# Patient Record
Sex: FEMALE | Race: WHITE | NOT HISPANIC OR LATINO | URBAN - METROPOLITAN AREA
[De-identification: names, ages, dates, MRNs, and addresses within clinical notes are randomized per-mention and may not be internally consistent; named-entity substitution may affect disease eponyms.]

---
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
## 2020-09-09 ENCOUNTER — OFFICE VISIT (OUTPATIENT)
Dept: PRIMARY CARE | Facility: CLINIC | Age: 20
End: 2020-09-09
Payer: COMMERCIAL

## 2020-09-09 DIAGNOSIS — L70.0 ACNE VULGARIS: ICD-10-CM

## 2020-09-09 DIAGNOSIS — G25.0 BENIGN ESSENTIAL TREMOR: ICD-10-CM

## 2020-09-09 DIAGNOSIS — N94.6 DYSMENORRHEA: Primary | ICD-10-CM

## 2020-09-09 PROCEDURE — 99203 OFFICE O/P NEW LOW 30 MIN: CPT | Performed by: NURSE PRACTITIONER

## 2020-09-09 RX ORDER — NAPROXEN 500 MG/1
500 TABLET ORAL 2 TIMES DAILY WITH MEALS
COMMUNITY
End: 2021-04-14 | Stop reason: SDUPTHER

## 2020-09-09 RX ORDER — NORETHINDRONE ACETATE AND ETHINYL ESTRADIOL 1MG-20(24)
1 KIT ORAL
COMMUNITY
Start: 2020-08-03 | End: 2021-01-13 | Stop reason: ALTCHOICE

## 2020-09-09 RX ORDER — DOXYCYCLINE 100 MG/1
100 CAPSULE ORAL 2 TIMES DAILY
COMMUNITY
End: 2021-12-28 | Stop reason: ALTCHOICE

## 2020-09-09 ASSESSMENT — ENCOUNTER SYMPTOMS
ENDOCRINE NEGATIVE: 1
DIZZINESS: 0
MUSCULOSKELETAL NEGATIVE: 1
WOUND: 0
CARDIOVASCULAR NEGATIVE: 1
DYSPHORIC MOOD: 0
HYPERACTIVE: 0
WEAKNESS: 0
RESPIRATORY NEGATIVE: 1
FACIAL ASYMMETRY: 0
EYES NEGATIVE: 1
SLEEP DISTURBANCE: 0
LIGHT-HEADEDNESS: 0
DECREASED CONCENTRATION: 0
NUMBNESS: 0
HEMATOLOGIC/LYMPHATIC NEGATIVE: 1
SEIZURES: 0
HEADACHES: 0
SPEECH DIFFICULTY: 0
CONSTITUTIONAL NEGATIVE: 1
COLOR CHANGE: 0
CONFUSION: 0
TREMORS: 1
AGITATION: 0
NERVOUS/ANXIOUS: 1
HALLUCINATIONS: 0

## 2020-09-09 NOTE — PROGRESS NOTES
Verification of Patient Location:  The patient affirms they are currently located in the following state: [place]    Request for Consent:    Video Encounter   Hello, my name is JULIO Mckeon.  Before we proceed, can you please verify your identification by telling me your full name and date of birth?  Can you tell me who is in the room with you?    You and I are about to have a telemedicine check-in or visit because you have requested it.  This is a live video-conference.  I am a real person, speaking to you in real time.  There is no one else with me on the video-conference.  However, when we use ([employer], [employer], etc) it is important for you to know that the video-conference may not be secure or private.  I am not recording this conversation and I am asking you not to record it.  This telemedicine visit will be billed to your health insurance or you, if you are self-insured.  You understand you will be responsible for any copayments or coinsurances that apply to your telemedicine visit.  Communication platform used for this encounter:  [employer]     Before starting our telemedicine visit, I am required to get your consent for this virtual check-in or visit by telemedicine. Do you consent?      Patient Response to Request for Consent:  Yes    Patient Active Problem List   Diagnosis   • Dysmenorrhea   • Benign essential tremor   • Acne vulgaris     Current Outpatient Medications on File Prior to Visit   Medication Sig Dispense Refill   • doxycycline hyclate (VIBRAMYCIN) 100 mg capsule Take 100 mg by mouth 2 (two) times a day.     • naproxen (NAPROSYN) 500 mg tablet Take 500 mg by mouth 2 (two) times a day with meals.     • BLISOVI 24 FE 1 mg-20 mcg (24)/75 mg (4) per tablet Take 1 tablet by mouth once daily.       No current facility-administered medications on file prior to visit.      No Known Allergies  Social History     Socioeconomic History   • Marital status: Single     Spouse name: Not on file    • Number of children: Not on file   • Years of education: Not on file   • Highest education level: Not on file   Occupational History   • Occupation: student     Comment: [employer]   Social Needs   • Financial resource strain: Not on file   • Food insecurity:     Worry: Not on file     Inability: Not on file   • Transportation needs:     Medical: Not on file     Non-medical: Not on file   Tobacco Use   • Smoking status: Never Smoker   • Smokeless tobacco: Never Used   Substance and Sexual Activity   • Alcohol use: Not Currently   • Drug use: Never   • Sexual activity: Not Currently   Lifestyle   • Physical activity:     Days per week: Not on file     Minutes per session: Not on file   • Stress: Not on file   Relationships   • Social connections:     Talks on phone: Not on file     Gets together: Not on file     Attends [employer] service: Not on file     Active member of club or organization: Not on file     Attends meetings of clubs or organizations: Not on file     Relationship status: Not on file   • Intimate partner violence:     Fear of current or ex partner: Not on file     Emotionally abused: Not on file     Physically abused: Not on file     Forced sexual activity: Not on file   Other Topics Concern   • Not on file   Social History Narrative   • Not on file     Health Maintenance   Topic Date Due   • MMR Vaccines (1 of 1 - Standard series) 10/01/2001   • Varicella Vaccines (1 of 2 - 2-dose childhood series) 10/01/2001   • DTaP, Tdap, and Td Vaccines (1 - Tdap) 10/01/2011   • HPV Vaccines (1 - Female 2-dose series) 10/01/2011   • HIV Screening  10/01/2013   • Hepatitis C Screening  10/01/2018   • Influenza Vaccine (1) 08/01/2020   • Hepatitis A vaccines  Aged Out   • Meningococcal ACWY  Aged Out   • HIB Vaccines  Aged Out   • Hepatitis B Vaccines  Aged Out   • IPV Vaccines  Aged Out   • Pneumococcal  Aged Out       Visit Documentation:  Subjective     Patient ID: Jada Holt is a 19 y.o.  female.  2000      New patient to get established.    Dysmenorrhea-chronic for years. Using OCs to manage and Naproxen PRN cramps. Regular menses. No current symptoms. GYN exam has been performed and is UTD.    Acne. Chronic for years. Has been on several topical and oral treatments. Doxycycline seems to work the best for her. Not using at present--acne has been stable.    Hx benign tremor in hands. Pediatrician worked up-no major findings. Minor at present. Does not want meds to treat. No weakness or N/T symptoms.      The following have been reviewed and updated as appropriate in this visit:  Allergies  Meds  Problems  Med Hx  Surg Hx  Fam Hx  Soc Hx      Review of Systems   Constitutional: Negative.    Eyes: Negative.    Respiratory: Negative.    Cardiovascular: Negative.    Gastrointestinal:        Dairy intolerance  Avoids dairy and gluten   Endocrine: Negative.    Genitourinary: Negative.         Hx yeast infection   Musculoskeletal: Negative.    Skin: Negative for color change, pallor, rash and wound.        Hx acne   Allergic/Immunologic: Positive for environmental allergies. Negative for food allergies and immunocompromised state.   Neurological: Positive for tremors. Negative for dizziness, seizures, syncope, facial asymmetry, speech difficulty, weakness, light-headedness, numbness and headaches.   Hematological: Negative.    Psychiatric/Behavioral: Negative for agitation, behavioral problems, confusion, decreased concentration, dysphoric mood, hallucinations, self-injury, sleep disturbance and suicidal ideas. The patient is nervous/anxious. The patient is not hyperactive.        Physical Exam   Constitutional: She is oriented to person, place, and time. She appears well-developed and well-nourished. No distress.   Pulmonary/Chest: Effort normal. No respiratory distress.   Neurological: She is alert and oriented to person, place, and time.   Skin: She is not diaphoretic.   Psychiatric: She has a  normal mood and affect. Her speech is normal and behavior is normal.       Assessment/Plan   Diagnoses and all orders for this visit:    Dysmenorrhea (Primary)  Assessment & Plan:  Stable on OCs and Naproxen PRN.  Has seen a GYN in the past.  Can come here for GYN if prefers.      Benign essential tremor  Assessment & Plan:  Not bothersome at present.  Mild at times.  No treatment requested at this time.      Acne vulgaris  Assessment & Plan:  Stable.  Not currently on Doxy--uses if she gets a flare.      Will get flu shot at school.  Time Spent in Medical Discussion During This Encounter:    15 minutes

## 2021-01-13 ENCOUNTER — OFFICE VISIT (OUTPATIENT)
Dept: PRIMARY CARE | Facility: CLINIC | Age: 21
End: 2021-01-13
Payer: COMMERCIAL

## 2021-01-13 VITALS
BODY MASS INDEX: 24.11 KG/M2 | HEIGHT: 62 IN | TEMPERATURE: 97.8 F | HEART RATE: 82 BPM | WEIGHT: 131 LBS | SYSTOLIC BLOOD PRESSURE: 120 MMHG | DIASTOLIC BLOOD PRESSURE: 82 MMHG

## 2021-01-13 DIAGNOSIS — J30.89 NON-SEASONAL ALLERGIC RHINITIS DUE TO OTHER ALLERGIC TRIGGER: Primary | ICD-10-CM

## 2021-01-13 DIAGNOSIS — N94.6 DYSMENORRHEA: ICD-10-CM

## 2021-01-13 DIAGNOSIS — F41.1 GENERALIZED ANXIETY DISORDER: ICD-10-CM

## 2021-01-13 PROCEDURE — 99213 OFFICE O/P EST LOW 20 MIN: CPT | Performed by: NURSE PRACTITIONER

## 2021-01-13 RX ORDER — NORGESTREL AND ETHINYL ESTRADIOL 0.3-0.03MG
1 KIT ORAL
COMMUNITY
Start: 2020-10-14 | End: 2021-08-19 | Stop reason: ALTCHOICE

## 2021-01-13 ASSESSMENT — ENCOUNTER SYMPTOMS
SORE THROAT: 0
HEARTBURN: 0
HEADACHES: 0
CHILLS: 0
SHORTNESS OF BREATH: 0
WEIGHT LOSS: 0
HEMOPTYSIS: 0
FEVER: 0
RHINORRHEA: 0
COUGH: 1
WHEEZING: 0

## 2021-01-13 ASSESSMENT — PATIENT HEALTH QUESTIONNAIRE - PHQ9: SUM OF ALL RESPONSES TO PHQ9 QUESTIONS 1 & 2: 0

## 2021-01-13 NOTE — PROGRESS NOTES
[facility]  Majo Howard  [address], [address]  [place] [address]  Phone: [phone]  Fax: [phone]      Patient ID: Jada Holt                              : 2000    Visit Date: 2021    Chief Complaint: Follow-up (had covid in oct and is having after affects like cough in the morning )         Patient ID: Jada Holt is a 20 y.o. female.    Patient Active Problem List   Diagnosis   • Dysmenorrhea   • Benign essential tremor   • Acne vulgaris   • Generalized anxiety disorder   • Non-seasonal allergic rhinitis         Current Outpatient Medications:   •  CRYSELLE, 28, 0.3-30 mg-mcg per tablet, Take 1 tablet by mouth once daily., Disp: , Rfl:   •  doxycycline hyclate (VIBRAMYCIN) 100 mg capsule, Take 100 mg by mouth 2 (two) times a day., Disp: , Rfl:   •  naproxen (NAPROSYN) 500 mg tablet, Take 500 mg by mouth 2 (two) times a day with meals., Disp: , Rfl:     No Known Allergies    Social History     Tobacco Use   • Smoking status: Never Smoker   • Smokeless tobacco: Never Used   Substance Use Topics   • Alcohol use: Not Currently   • Drug use: Never       Health Maintenance   Topic Date Due   • Influenza Vaccine (1) 2022 (Originally 2020)   • DTaP, Tdap, and Td Vaccines (7 - Td) 2021   • Meningococcal ACWY  Completed   • Varicella Vaccines  Completed   • HIB Vaccines  Completed   • IPV Vaccines  Completed   • HPV Vaccines  Completed   • Pneumococcal  Aged Out   • HIV Screening  Discontinued   • Hepatitis C Screening  Discontinued       HPI  Follow up.  Doing better with anxiety.  Mostly COVID related.  Got COVID in November.  Mild symptoms for about 1 week.  Recovered.  Wants to be checked out to make sure lungs are ok.  Denies CP, SOB  Has some post nasal drip.    Cough  This is a new problem. The current episode started more than 1 month ago. The problem has been unchanged. The cough is non-productive. Associated  "symptoms include postnasal drip. Pertinent negatives include no chest pain, chills, ear pain, fever, headaches, heartburn, hemoptysis, nasal congestion, rhinorrhea, sore throat, shortness of breath, weight loss or wheezing. Exacerbated by: worse in AM. She has tried nothing for the symptoms. Her past medical history is significant for environmental allergies.       The following have been reviewed and updated as appropriate in this visit:  Allergies  Meds  Problems         Review of System  Review of Systems   Constitutional: Negative for chills, fever and weight loss.   HENT: Positive for postnasal drip. Negative for ear pain, rhinorrhea and sore throat.    Respiratory: Positive for cough. Negative for hemoptysis, shortness of breath and wheezing.    Cardiovascular: Negative for chest pain.   Gastrointestinal: Negative for heartburn.   Allergic/Immunologic: Positive for environmental allergies.   Neurological: Negative for headaches.       Objective     Vitals  Vitals:    01/13/21 1410   BP: 120/82   BP Location: Left upper arm   Patient Position: Sitting   Pulse: 82   Temp: 36.6 °C (97.8 °F)   Weight: 59.4 kg (131 lb)   Height: 1.582 m (5' 2.3\")     Body mass index is 23.73 kg/m².    Physical Exam  Physical Exam  Vitals signs reviewed.   Constitutional:       General: She is not in acute distress.     Appearance: Normal appearance. She is not diaphoretic.   HENT:      Right Ear: Tympanic membrane, ear canal and external ear normal.      Left Ear: Tympanic membrane, ear canal and external ear normal.      Nose: Nose normal.      Mouth/Throat:      Mouth: Mucous membranes are moist.      Pharynx: Oropharynx is clear. Posterior oropharyngeal erythema present. No oropharyngeal exudate.   Cardiovascular:      Rate and Rhythm: Normal rate and regular rhythm.      Heart sounds: No murmur. No friction rub. No gallop.    Pulmonary:      Effort: Pulmonary effort is normal.      Breath sounds: Normal breath sounds. No " wheezing, rhonchi or rales.   Neurological:      Mental Status: She is alert and oriented to person, place, and time.   Psychiatric:         Mood and Affect: Mood and affect normal.         Speech: Speech normal.         Behavior: Behavior normal.         Thought Content: Thought content does not include suicidal ideation. Thought content does not include suicidal plan.         Assessment/Plan     Problem List Items Addressed This Visit     Dysmenorrhea     Stable on OCs.  Continue med.  No side effects.  GYN manages.         Generalized anxiety disorder     Stable.  Doing better now.  Not taking meds.  Encouraged healthy diet, regular exercise, adequate sleep.         Non-seasonal allergic rhinitis - Primary     Saline NS BID  Claritin or Zyrtec daily  Push po fluids.           Over 50% of 20 minute visit spent in counseling and education today          JULIO Mckeon  1/13/2021

## 2021-01-13 NOTE — ASSESSMENT & PLAN NOTE
Stable.  Doing better now.  Not taking meds.  Encouraged healthy diet, regular exercise, adequate sleep.

## 2021-05-10 ENCOUNTER — IMMUNIZATION (OUTPATIENT)
Dept: IMMUNIZATION | Facility: CLINIC | Age: 21
End: 2021-05-10

## 2021-06-08 ENCOUNTER — IMMUNIZATION (OUTPATIENT)
Dept: IMMUNIZATION | Facility: CLINIC | Age: 21
End: 2021-06-08
Attending: FAMILY MEDICINE

## 2021-07-09 DIAGNOSIS — N94.6 DYSMENORRHEA: ICD-10-CM

## 2021-07-09 RX ORDER — NAPROXEN 500 MG/1
TABLET ORAL
Qty: 180 TABLET | Refills: 0 | Status: SHIPPED | OUTPATIENT
Start: 2021-07-09 | End: 2021-10-12

## 2021-07-09 NOTE — TELEPHONE ENCOUNTER
Medicine Refill Request    Last Office Visit: 1/13/2021  Last Telemedicine Visit: Visit date not found    Next Office Visit: Visit date not found  Next Telemedicine Visit: Visit date not found         Current Outpatient Medications:   •  CRYSELLE, 28, 0.3-30 mg-mcg per tablet, Take 1 tablet by mouth once daily., Disp: , Rfl:   •  doxycycline hyclate (VIBRAMYCIN) 100 mg capsule, Take 100 mg by mouth 2 (two) times a day., Disp: , Rfl:   •  naproxen (NAPROSYN) 500 mg tablet, Take 1 tablet (500 mg total) by mouth 2 (two) times a day with meals., Disp: 180 tablet, Rfl: 0      BP Readings from Last 3 Encounters:   01/13/21 120/82       Recent Lab results:  No results found for: CHOL, No results found for: HDL, No results found for: LDLCALC, No results found for: TRIG     No results found for: GLUCOSE, No results found for: HGBA1C      No results found for: CREATININE    No results found for: TSH

## 2021-08-02 PROBLEM — K90.0 CELIAC DISEASE: Status: ACTIVE | Noted: 2021-08-02

## 2021-08-19 ENCOUNTER — OFFICE VISIT (OUTPATIENT)
Dept: PRIMARY CARE | Facility: CLINIC | Age: 21
End: 2021-08-19
Payer: COMMERCIAL

## 2021-08-19 VITALS
RESPIRATION RATE: 16 BRPM | WEIGHT: 139 LBS | HEART RATE: 82 BPM | OXYGEN SATURATION: 98 % | SYSTOLIC BLOOD PRESSURE: 110 MMHG | BODY MASS INDEX: 25.58 KG/M2 | DIASTOLIC BLOOD PRESSURE: 60 MMHG | HEIGHT: 62 IN

## 2021-08-19 DIAGNOSIS — B00.9 HERPES SIMPLEX: ICD-10-CM

## 2021-08-19 DIAGNOSIS — L70.0 ACNE VULGARIS: ICD-10-CM

## 2021-08-19 DIAGNOSIS — K90.0 CELIAC DISEASE: Primary | ICD-10-CM

## 2021-08-19 PROCEDURE — 99213 OFFICE O/P EST LOW 20 MIN: CPT | Performed by: NURSE PRACTITIONER

## 2021-08-19 PROCEDURE — 3008F BODY MASS INDEX DOCD: CPT | Performed by: NURSE PRACTITIONER

## 2021-08-19 RX ORDER — NORETHINDRONE ACETATE AND ETHINYL ESTRADIOL 1MG-20(24)
1 KIT ORAL
COMMUNITY
Start: 2021-06-30

## 2021-08-19 RX ORDER — VALACYCLOVIR HYDROCHLORIDE 1 G/1
1000 TABLET, FILM COATED ORAL DAILY
Qty: 30 TABLET | Refills: 1 | Status: SHIPPED | OUTPATIENT
Start: 2021-08-19 | End: 2021-09-13

## 2021-08-19 ASSESSMENT — ENCOUNTER SYMPTOMS
RESPIRATORY NEGATIVE: 1
CARDIOVASCULAR NEGATIVE: 1
CONSTITUTIONAL NEGATIVE: 1
GASTROINTESTINAL NEGATIVE: 1

## 2021-08-19 NOTE — ASSESSMENT & PLAN NOTE
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  Valtrex Rx given to use daily during stressful weeks or PRN

## 2021-08-19 NOTE — PROGRESS NOTES
[facility]  [place]  [place] [address]  [phone]  Fax [phone]      Patient ID: Jada Holt                              : 2000    Visit Date: 2021    Chief Complaint: Celiac Disease         Patient ID: Jada Holt is a 20 y.o. female.    Patient Active Problem List   Diagnosis   • Dysmenorrhea   • Benign essential tremor   • Acne vulgaris   • Generalized anxiety disorder   • Non-seasonal allergic rhinitis   • Celiac disease   • Herpes simplex         Current Outpatient Medications:   •  BLISOVI 24 FE 1 mg-20 mcg (24)/75 mg (4) per tablet, Take 1 tablet by mouth once daily., Disp: , Rfl:   •  doxycycline hyclate (VIBRAMYCIN) 100 mg capsule, Take 100 mg by mouth 2 (two) times a day., Disp: , Rfl:   •  naproxen (NAPROSYN) 500 mg tablet, TAKE 1 TABLET BY MOUTH 2 TIMES A DAY WITH MEALS., Disp: 180 tablet, Rfl: 0  •  valACYclovir (VALTREX) 1 gram tablet, Take 1 tablet (1,000 mg total) by mouth daily., Disp: 30 tablet, Rfl: 1    Allergies   Allergen Reactions   • Gluten Diarrhea and GI intolerance       Social History     Tobacco Use   • Smoking status: Never Smoker   • Smokeless tobacco: Never Used   Substance Use Topics   • Alcohol use: Not Currently   • Drug use: Never       Health Maintenance   Topic Date Due   • Influenza Vaccine (1) 2021   • DTaP, Tdap, and Td Vaccines (7 - Td or Tdap) 2021   • Meningococcal ACWY  Completed   • HIB Vaccines  Completed   • IPV Vaccines  Completed   • HPV Vaccines  Completed   • COVID-19 Vaccine  Completed   • Pneumococcal  Aged Out   • HIV Screening  Discontinued   • Hepatitis C Screening  Discontinued       HPI  College joceline @ [employer]  Leaving tomorrow for college.  Here for follow up.  Dx with Celiac disease recently.  Doing well.  Easy to follow diet for her.  Needs Rx for cold sores.  Doing well on OCs-- menses very light now.      The following have been reviewed and  "updated as appropriate in this visit:  Allergies  Meds  Problems         Review of System  Review of Systems   Constitutional: Negative.    Respiratory: Negative.    Cardiovascular: Negative.    Gastrointestinal: Negative.        Objective     Vitals  Vitals:    08/19/21 1105   BP: 110/60   Pulse: 82   Resp: 16   SpO2: 98%   Weight: 63 kg (139 lb)   Height: 1.582 m (5' 2.3\")     Body mass index is 25.18 kg/m².    Physical Exam  Physical Exam  Vitals reviewed.   Constitutional:       General: She is not in acute distress.     Appearance: Normal appearance. She is not diaphoretic.   Cardiovascular:      Rate and Rhythm: Normal rate and regular rhythm.      Heart sounds: No murmur heard.   No friction rub. No gallop.    Pulmonary:      Effort: Pulmonary effort is normal.      Breath sounds: Normal breath sounds. No wheezing, rhonchi or rales.   Neurological:      Mental Status: She is alert and oriented to person, place, and time.   Psychiatric:         Mood and Affect: Mood and affect normal.         Speech: Speech normal.         Behavior: Behavior normal.         Assessment/Plan     Problem List Items Addressed This Visit     Acne vulgaris     Using Doxycyline PRN when her acne flares( not that often).         Celiac disease - Primary     New dx.  Stable.  Has been compliant with strict gluten free diet.  GI following.  No current manifestations.           Herpes simplex     Occasional flares of cold sores with stress.  Valtrex Rx given to use daily during stressful weeks or PRN         Relevant Medications    valACYclovir (VALTREX) 1 gram tablet              JULIO Mckeon  8/19/2021  "

## 2021-08-19 NOTE — ASSESSMENT & PLAN NOTE
New dx.  Stable.  Has been compliant with strict gluten free diet.  GI following.  No current manifestations.

## 2021-09-12 DIAGNOSIS — B00.9 HERPES SIMPLEX: ICD-10-CM

## 2021-09-13 RX ORDER — VALACYCLOVIR HYDROCHLORIDE 1 G/1
1000 TABLET, FILM COATED ORAL DAILY
Qty: 30 TABLET | Refills: 1 | Status: SHIPPED | OUTPATIENT
Start: 2021-09-13 | End: 2021-10-06

## 2021-09-13 NOTE — TELEPHONE ENCOUNTER
Medicine Refill Request    Last Office Visit: 8/19/2021  Last Telemedicine Visit: Visit date not found    Next Office Visit: Visit date not found  Next Telemedicine Visit: Visit date not found         Current Outpatient Medications:   •  BLISOVI 24 FE 1 mg-20 mcg (24)/75 mg (4) per tablet, Take 1 tablet by mouth once daily., Disp: , Rfl:   •  doxycycline hyclate (VIBRAMYCIN) 100 mg capsule, Take 100 mg by mouth 2 (two) times a day., Disp: , Rfl:   •  naproxen (NAPROSYN) 500 mg tablet, TAKE 1 TABLET BY MOUTH 2 TIMES A DAY WITH MEALS., Disp: 180 tablet, Rfl: 0  •  valACYclovir (VALTREX) 1 gram tablet, Take 1 tablet (1,000 mg total) by mouth daily., Disp: 30 tablet, Rfl: 1      BP Readings from Last 3 Encounters:   08/19/21 110/60   01/13/21 120/82       Recent Lab results:  No results found for: CHOL, No results found for: HDL, No results found for: LDLCALC, No results found for: TRIG     No results found for: GLUCOSE, No results found for: HGBA1C      No results found for: CREATININE    No results found for: TSH

## 2021-09-14 ENCOUNTER — TELEMEDICINE (OUTPATIENT)
Dept: PRIMARY CARE | Facility: CLINIC | Age: 21
End: 2021-09-14
Payer: COMMERCIAL

## 2021-09-14 VITALS — BODY MASS INDEX: 25.4 KG/M2 | WEIGHT: 138 LBS | TEMPERATURE: 98.1 F | HEIGHT: 62 IN

## 2021-09-14 DIAGNOSIS — J40 BRONCHITIS: Primary | ICD-10-CM

## 2021-09-14 PROCEDURE — 3008F BODY MASS INDEX DOCD: CPT | Performed by: INTERNAL MEDICINE

## 2021-09-14 PROCEDURE — 99213 OFFICE O/P EST LOW 20 MIN: CPT | Mod: 95 | Performed by: INTERNAL MEDICINE

## 2021-09-14 RX ORDER — AZITHROMYCIN 250 MG/1
TABLET, FILM COATED ORAL
Qty: 6 TABLET | Refills: 0 | Status: SHIPPED | OUTPATIENT
Start: 2021-09-14 | End: 2021-09-19

## 2021-09-14 RX ORDER — ALBUTEROL SULFATE 90 UG/1
2 INHALANT RESPIRATORY (INHALATION) EVERY 6 HOURS PRN
Qty: 18 G | Refills: 1 | Status: SHIPPED | OUTPATIENT
Start: 2021-09-14 | End: 2021-10-14

## 2021-09-14 RX ORDER — BENZONATATE 100 MG/1
100 CAPSULE ORAL 3 TIMES DAILY PRN
Qty: 30 CAPSULE | Refills: 0 | Status: SHIPPED | OUTPATIENT
Start: 2021-09-14 | End: 2021-09-24

## 2021-09-14 ASSESSMENT — ENCOUNTER SYMPTOMS
WHEEZING: 1
HEMOPTYSIS: 0
MYALGIAS: 1
RHINORRHEA: 1
CHILLS: 0
FEVER: 0
HEARTBURN: 0
HEADACHES: 0
SORE THROAT: 0
SWEATS: 0
SHORTNESS OF BREATH: 0
COUGH: 1

## 2021-09-14 NOTE — ASSESSMENT & PLAN NOTE
URI.  Started with rhinorrhea, always gets infections when goes back to school.  Cough x 2 days.  Wheezy.  Hx bronchitis as child.  Greenish phlegm.  No fever.  Plan  Antibiotics Z pack as directed.  Benzonatate for cough suppression.  Albuterol inhaler.  Continue Mucinex over the counter to loosen secretions.  Continue tylenol as needed.  Try saline spray, steam inhalation, lozenges, humidifier to keep membranes moist.

## 2021-09-14 NOTE — PROGRESS NOTES
Verification of Patient Location:  The patient affirms they are currently located in the following state: [place]    Request for Consent:    Audio and Video Encounter   Teena, my name is Dina Gaona MD.  Before we proceed, can you please verify your identification by telling me your full name and date of birth?  Can you tell me who is in the room with you?    You and I are about to have a telemedicine check-in or visit because you have requested it.  This is a live video-conference.  I am a real person, speaking to you in real time.  There is no one else with me on the video-conference.  However, when we use ([employer], [employer], etc) it is important for you to know that the video-conference may not be secure or private.  I am not recording this conversation and I am asking you not to record it.  This telemedicine visit will be billed to your health insurance or you, if you are self-insured.  You understand you will be responsible for any copayments or coinsurances that apply to your telemedicine visit.  Communication platform used for this encounter:  [employer]     Before starting our telemedicine visit, I am required to get your consent for this virtual check-in or visit by telemedicine. Do you consent?      Patient Response to Request for Consent:  Yes      Visit Documentation:  Subjective     Patient ID: Jada Holt is a 20 y.o. female.  2000      Telemed visit for cough.     Developed a cough last week- started with clear runny nose.   Is now back at school.   No body aches, fever, sore throat.   Cough getting worse last 1-2 days.   Had home game-- thought it was from yelling.   Last night got worse.  Hx bronchitis going around campus.     Phlegm greenish.    Cough  This is a new problem. The current episode started in the past 7 days. The problem has been gradually worsening. The problem occurs constantly. The cough is productive of purulent sputum. Associated symptoms include ear pain, myalgias,  nasal congestion, postnasal drip, rhinorrhea and wheezing. Pertinent negatives include no chest pain, chills, ear congestion, fever, headaches, heartburn, hemoptysis, rash, sore throat, shortness of breath or sweats.       The following have been reviewed and updated as appropriate in this visit:  Tobacco  Allergies  Meds  Problems  Med Hx  Surg Hx  Fam Hx  Soc Hx        Review of Systems   Constitutional: Negative for chills and fever.   HENT: Positive for ear pain, postnasal drip and rhinorrhea. Negative for sore throat.    Respiratory: Positive for cough and wheezing. Negative for hemoptysis and shortness of breath.    Cardiovascular: Negative for chest pain.   Gastrointestinal: Negative for heartburn.   Musculoskeletal: Positive for myalgias.   Skin: Negative for rash.   Neurological: Negative for headaches.     Video Exam:  Atraumatic, normocephalic.  Ill appearing. Sounds congested.  Cough wheezy.   Eyes no discharge, redness; ears, nose visually normal.   Breathing comfortably, talking in complete sentences.   No obvious distress. No wheezing.   Mental status normal, visible cranial nerves normal.  No accessory muscle use.       Assessment/Plan   Diagnoses and all orders for this visit:    Bronchitis (Primary)  Assessment & Plan:  URI.  Started with rhinorrhea, always gets infections when goes back to school.  Cough x 2 days.  Wheezy.  Hx bronchitis as child.  Greenish phlegm.  No fever.  Plan  Antibiotics Z pack as directed.  Benzonatate for cough suppression.  Albuterol inhaler.  Continue Mucinex over the counter to loosen secretions.  Continue tylenol as needed.  Try saline spray, steam inhalation, lozenges, humidifier to keep membranes moist.          Orders:  -     azithromycin (ZITHROMAX) 250 mg tablet; Take 2 tablets the first day, then 1 tablet daily for 4 days.  -     albuterol HFA (VENTOLIN HFA) 90 mcg/actuation inhaler; Inhale 2 puffs every 6 (six) hours as needed for wheezing.  -      benzonatate (TESSALON PERLES) 100 mg capsule; Take 1 capsule (100 mg total) by mouth 3 (three) times a day as needed for cough for up to 10 days.      Time Spent:  I spent 20 minutes on this date of service performing the following activities: obtaining history, entering orders, documenting, preparing for visit and providing counseling and education.

## 2021-10-06 DIAGNOSIS — B00.9 HERPES SIMPLEX: ICD-10-CM

## 2021-10-06 RX ORDER — VALACYCLOVIR HYDROCHLORIDE 1 G/1
1000 TABLET, FILM COATED ORAL DAILY
Qty: 30 TABLET | Refills: 1 | Status: SHIPPED | OUTPATIENT
Start: 2021-10-06 | End: 2023-01-11 | Stop reason: SDUPTHER

## 2021-10-06 NOTE — TELEPHONE ENCOUNTER
Medicine Refill Request    Last Office Visit: 8/19/2021  Last Telemedicine Visit: 9/14/2021 Dnia Gaona MD    Next Office Visit: Visit date not found  Next Telemedicine Visit: Visit date not found         Current Outpatient Medications:   •  albuterol HFA (VENTOLIN HFA) 90 mcg/actuation inhaler, Inhale 2 puffs every 6 (six) hours as needed for wheezing., Disp: 18 g, Rfl: 1  •  BLISOVI 24 FE 1 mg-20 mcg (24)/75 mg (4) per tablet, Take 1 tablet by mouth once daily., Disp: , Rfl:   •  doxycycline hyclate (VIBRAMYCIN) 100 mg capsule, Take 100 mg by mouth 2 (two) times a day., Disp: , Rfl:   •  naproxen (NAPROSYN) 500 mg tablet, TAKE 1 TABLET BY MOUTH 2 TIMES A DAY WITH MEALS., Disp: 180 tablet, Rfl: 0  •  valACYclovir (VALTREX) 1 gram tablet, TAKE 1 TABLET (1,000 MG TOTAL) BY MOUTH DAILY., Disp: 30 tablet, Rfl: 1      BP Readings from Last 3 Encounters:   08/19/21 110/60   01/13/21 120/82       Recent Lab results:  No results found for: CHOL, No results found for: HDL, No results found for: LDLCALC, No results found for: TRIG     No results found for: GLUCOSE, No results found for: HGBA1C      No results found for: CREATININE    No results found for: TSH

## 2021-10-12 ENCOUNTER — TELEPHONE (OUTPATIENT)
Dept: PRIMARY CARE | Facility: CLINIC | Age: 21
End: 2021-10-12

## 2021-10-12 DIAGNOSIS — N94.6 DYSMENORRHEA: ICD-10-CM

## 2021-10-12 RX ORDER — NAPROXEN 500 MG/1
TABLET ORAL
Qty: 180 TABLET | Refills: 0 | Status: SHIPPED | OUTPATIENT
Start: 2021-10-12 | End: 2023-08-16 | Stop reason: ALTCHOICE

## 2021-10-12 NOTE — TELEPHONE ENCOUNTER
Mom  Called and would like to speak to Majo at some point. This patient is in the hospital and very sick.   Can we call her?

## 2021-10-12 NOTE — TELEPHONE ENCOUNTER
Medicine Refill Request    Last Office Visit: 8/19/2021  Last Telemedicine Visit: 9/14/2021 Dina Gaona MD    Next Office Visit: Visit date not found  Next Telemedicine Visit: Visit date not found         Current Outpatient Medications:   •  albuterol HFA (VENTOLIN HFA) 90 mcg/actuation inhaler, Inhale 2 puffs every 6 (six) hours as needed for wheezing., Disp: 18 g, Rfl: 1  •  BLISOVI 24 FE 1 mg-20 mcg (24)/75 mg (4) per tablet, Take 1 tablet by mouth once daily., Disp: , Rfl:   •  doxycycline hyclate (VIBRAMYCIN) 100 mg capsule, Take 100 mg by mouth 2 (two) times a day., Disp: , Rfl:   •  naproxen (NAPROSYN) 500 mg tablet, TAKE 1 TABLET BY MOUTH 2 TIMES A DAY WITH MEALS., Disp: 180 tablet, Rfl: 0  •  valACYclovir (VALTREX) 1 gram tablet, TAKE 1 TABLET (1,000 MG TOTAL) BY MOUTH DAILY., Disp: 30 tablet, Rfl: 1      BP Readings from Last 3 Encounters:   08/19/21 110/60   01/13/21 120/82       Recent Lab results:  No results found for: CHOL, No results found for: HDL, No results found for: LDLCALC, No results found for: TRIG     No results found for: GLUCOSE, No results found for: HGBA1C      No results found for: CREATININE    No results found for: TSH

## 2021-10-12 NOTE — TELEPHONE ENCOUNTER
Spoke with mom Lori.  Pt is a [employer] student.  Pt admitted for pneumonia and gram + sepsis. At [facility]( [facility]. Getting IV antibiotics and is on O2. Mom will keep us updated.

## 2021-10-22 NOTE — TELEPHONE ENCOUNTER
Spoke with mother Lori.  Pt done IV antibiotics today  Returning to classes Monday.  Anxious and worried she is behind.  Suggested counselor and school advocate to help her.  Appt here when she is home for thanksgiving break.

## 2021-10-22 NOTE — TELEPHONE ENCOUNTER
Mom called back today, daughter has been released, still having some pain left side, fatigue    Mom called back with an update    Mom's number [phone]

## 2021-11-23 ENCOUNTER — OFFICE VISIT (OUTPATIENT)
Dept: PRIMARY CARE | Facility: CLINIC | Age: 21
End: 2021-11-23
Payer: COMMERCIAL

## 2021-11-23 DIAGNOSIS — F41.8 OTHER SPECIFIED ANXIETY DISORDERS: Primary | ICD-10-CM

## 2021-11-23 ASSESSMENT — COGNITIVE AND FUNCTIONAL STATUS - GENERAL
DELUSIONS: NONE OR AGE APPROPRIATE
RECENT MEMORY: WNL
THOUGHT_PROCESS: WNL
IMPULSE CONTROL: INTACT
SLEEP_WAKE_CYCLE: NO CHANGE
APPEARANCE: WELL GROOMED
EYE_CONTACT: WNL
ATTENTION: WNL
PERCEPTUAL FUNCTION: NORMAL
ORIENTATION: FULLY ORIENTED
AROUSAL LEVEL: ALERT
APPETITE: NO CHANGE
AFFECT: FULL RANGE
INSIGHT: INTACT
CONCENTRATION: WNL
REMOTE MEMORY: MILD LOSS
THOUGHT_CONTENT: APPROPRIATE
PSYCHOMOTOR FUNCTIONING: WNL
MOOD: EUTHYMIC (NORMAL)
SPEECH: REGULAR

## 2021-11-23 NOTE — PROGRESS NOTES
Integrated Behavioral Health Outpatient Initial Visit- In office  Visit Type Performed: In-person    Jada Holt, a 21 y.o. female, presented today for an initial behavioral health evaluation visit.  Clinician confirmed identification of patient by name and birth date.      Informed Consent/Confidentiality:  Pt was explained the model of primary care behavioral health we provide at [facility], including the following:  The doctoral psychology intern/post-doctoral psychology fellow is under the direct supervision of a licensed psychologist (Suzi Sue Psy.D., Lilo Boone Psy.D., Huong Ruiz Psy.D., and Garrison Miller Psy.D.).   The psychology intern/fellow collaborates regularly with the pt’s PCP regarding the pt’s treatment. The integrated behavioral health progress notes are visible to the physicians and advanced practitioners in the practice where the pt is being seen. The visit diagnosis and appointment/scheduling information is visible to the patient's EPIC chart, to provide continuity of care across the [facility] system. The pt will also have access to view their notes via [employer] (pt portal).  This is a low-intensity model of care (we provide 8-10 visits of cognitive-behavioral therapy), and the patient has the right to other options of behavioral health care that are indicated for more severe conditions (ie: Traditional Outpatient psychotherapy, Intensive Outpatient Programs, Partial Hospitalization Programs, and Inpatient services).  The intern/fellow is providing this care by participating in a training program, and therefore they can not be involved with any legal issues/forms (including disability, FMLA, etc.).    Also discussed were confidentiality and the limits of confidentiality (ie: if pt is at imminent risk of suicide, homicide, or reports child abuse/neglect, providers may need to break confidentiality to ensure the safety of the pt or to follow mandated reporting requirements).   Pt expressed  "understanding and consent: Yes      SUBJECTIVE     Reason for visit: Samna presented today regarding increased anxiety-related symptoms     History of Behavioral Health Treatment  Previous treatment: Previous therapy: yes, the last time she was in therapy was in the summer of  due to a \"fear of eating carbs\" (e.g., rice, pasta).   Previously experienced symptoms of: Stress and anxiety   Other pertinent behavioral health history: Paternal aunt was diagnosed with bipolar disorder and  by suicide (Saman stated that she did not have a strong relationship with her aunt). Her maternal cousin also has depression.       Substance Use History  ETOH/alcohol use: minimal use, she has one drink per night (liquor) Thursday through Saturday, socially.    Other substance or supplement use: drugs: denied.; tobacco: denied; caffeine: coffee 1 /day      Social History  Important people in pt's life/Support network: Single. Has a close group of friends that she considers to be her strongest social supports.   Lives with: Currently lives in an apartment at [facility] with three roommates. She is planning on moving within the next few months which contributes to increased stress.    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 She experiences increased stress with her academic work load and feels like the impact of Covid negatively impacted her ability to study and retain information.       Symptoms  Depression symptoms: PHQ 9:  Little Interest or Pleasure in Doing Things: 0-->not at all    Feeling Down, Depressed or Hopeless: 0-->not at all    Trouble Falling or Staying Asleep, or Sleeping Too Much: 1-->several days (night awakenings)    Feeling Tired or Having Little Energy: 2-->more than half the days    Poor Appetite or " Overeatin-->several days (over-eating)    Feeling Bad about Yourself - or that You are a Failure or Have Let Yourself or Your Family Down: 1-->several days    Trouble Concentrating on Things, Such as Reading the Newspaper or Watching Television: 2-->more than half the days    Moving or Speaking So Slowly that Other People Could Have Noticed? Or the Opposite - Being So Fidgety: 0-->not at all    Thoughts that You Would be Better Off Dead or of Hurting Yourself in Some Way: 0-->not at all    PHQ-9: Brief Depression Severity Measure Score: 7      Anxiety symptoms: ENEDINA-7  Feeling nervous, anxious or on edge: 2-->More than half the days    Not being able to stop or control worryin-->Not at all    Worrying too much about different things: 1-->Several days    Trouble relaxin-->Not at all    Being so restless that it is hard to sit still: 0-->Not at all    Becoming easily annoyed or irritable: 0-->Not at all    Feeling afraid as if something awful might happen: 0-->Not at all      GAD7 Total Score: : 3        Additional reported symptoms: increased heart rate       OBJECTIVE     Mental Status Exam  Appearance: Well Groomed  Speech: Regular  Psychomotor Functioning: WNL  Eye Contact: WNL  Orientation: Fully oriented  Attention: WNL  Concentration: WNL  Recent Memory: WNL  Remote Memory: WNL   Thought Content: Appropriate  Thought Process: WNL  Insight: Intact  Perceptual Function: Normal  Delusions: None or age appropriate  Sleeping: No Change  Appetite: No Change  Affect: Full Range  Mood: Euthymic (normal)      ASSESSMENT     Psychotropic medications: N/A   Current Outpatient Medications   Medication Sig Dispense Refill   • albuterol HFA (VENTOLIN HFA) 90 mcg/actuation inhaler Inhale 2 puffs every 6 (six) hours as needed for wheezing. 18 g 1   • BLISOVI 24 FE 1 mg-20 mcg (24)/75 mg (4) per tablet Take 1 tablet by mouth once daily.     • doxycycline hyclate (VIBRAMYCIN) 100 mg capsule Take 100 mg by mouth 2  (two) times a day.     • naproxen (NAPROSYN) 500 mg tablet TAKE 1 TABLET BY MOUTH 2 TIMES A DAY WITH MEALS. 180 tablet 0   • valACYclovir (VALTREX) 1 gram tablet TAKE 1 TABLET (1,000 MG TOTAL) BY MOUTH DAILY. 30 tablet 1     No current facility-administered medications for this visit.         Suicidal Ideation/Homicidal Ideation Risk Assessment  Risk Factors: None   Protective factors: Connectedness to individuals, family, community, and social institutions    Suicidal Ideation: Not Present  Self Injurious Behavior:  Not Present  Homicidal Behavior: Not Present  Estimate of Current Risk: Minimal Risk     Plan for Safety-   N/A: risk is assessed to be minimal, therefore developing a safety plan is not indicated at this time.        Screening measures administered during this visit  PHQ-9: Brief Depression Severity Measure Score: 7  GAD7 Total Score: : 3      ASSESSMENT / IMPRESSIONS  Jada Holt seems to be experiencing other specified anxiety disorder, as she does not currently meet diagnostic criteria for generalized anxiety disorder or another anxiety disorder. Her symptoms are largely related to increased school demands and her tendency to please her friends/spread herself too thin socially. Her anxiety-related symptoms have significantly decreased over the past year, as she endorsed previously having a fear of eating carbohydrates and experiencing body image concerns. Her current diet and exercise regimens are appropriate and healthy, and she no longer experiences negative thinking regarding food or her body image. CBT will likely be an effective treatment in reducing these presenting problems.   Severity: mild, chronicity: chronic duration: approx. 1 year   Associated factors include the impact of Covid-19 and mild stress with having 3 roommates.    Prognostic protective factors include her motivation to engage in therapy, insight into her presenting problems, and a large social support network. Prognostic  risk factors include having a family member who  by suicide.      PLAN     Goals:  - Decrease anxiety-related symptoms     Recommendations for treatment: Individual Therapy, 30 minutes 2 times monthly    Recommendations for Interventions:Cognitive Behavior Training      Next visit plan  CBT and anxiety models, and 4-column technique will be introduced. Coping strategies will also be explored.

## 2021-11-24 NOTE — PROGRESS NOTES
I reviewed the Psychology Resident's/Post-Doctoral Fellow's note and agree with the documented findings and plan of care, except for my comments below or within the additional notes today.

## 2021-12-09 ENCOUNTER — TELEMEDICINE (OUTPATIENT)
Dept: PRIMARY CARE | Facility: CLINIC | Age: 21
End: 2021-12-09
Payer: COMMERCIAL

## 2021-12-09 DIAGNOSIS — F41.8 OTHER SPECIFIED ANXIETY DISORDERS: Primary | ICD-10-CM

## 2021-12-09 NOTE — PROGRESS NOTES
Integrated Behavioral Health Follow-up Visit Note  Visit Type Performed: Audio and Video Encounter   Hello, my name is Sparkle Ruth.  Before we proceed, can you please verify your identification by telling me your full name and date of birth?  Can you tell me who is in the room with you?    You and I are about to have a telemedicine check-in or visit because you have requested it.  This is a live video-conference.  I am a real person, speaking to you in real time.  There is no one else with me on the video-conference.  However, when we use ([employer], [employer], etc) it is important for you to know that the video-conference may not be secure or private.  I am not recording this conversation and I am asking you not to record it.  This telemedicine visit will be billed to your health insurance or you, if you are self-insured.  You understand you will be responsible for any copayments or coinsurances that apply to your telemedicine visit.  Communication platform used for this encounter:  [email or web address] Video Visit (no Zoom)     Before starting our telemedicine visit, I am required to get your consent for this virtual check-in or visit by telemedicine. Do you consent?    Visit number: 2     Duration: 30 minutes   Jada Holt 2000 a 21 y.o.female, who was contacted today for a behavioral health visit.    Clinician confirmed identification of patient by name and birthdate, provider name, location of patient and clinician, and callback number in case disconnected.  This visit was conducted via telehealth/telephone in lieu of an in-person visit due to precautions related to the COVID-19 pandemic.  Verification of Patient Location: The patient affirms they are currently located in the following state: [place]     Request for Consent: You and I are about to have a tele-health check-in or visit. This is allowed because you are already a patient of our [facility] practice, and you have requested it.  Before starting our  telemedicine visit, I am required to get your consent for this virtual check-in or visit by tele-health . Do you consent?    Patient Response to Request for Consent: Yes    SUBJECTIVE     Reason for visit: Follow-up for management of anxiety-related symptoms     Symptoms  Depression symptoms: Negative self-talk   Anxiety symptoms: Excessive worry that is difficult to control and avoidance   Additional reported symptoms: Nausea       OBJECTIVE     Mental Status Evaluation  Patient's mood and affect were consistent with the context, and consistent with their baseline: Yes   Comments: presented as calm and pleasant, alert, oriented, in no apparent distress with a full range of affect, normal behavior, and no psychotic features.      Additional Assessments completed this visit  N/A      Suicidal Ideation/Homicidal Ideation Risk Assessment: not assessed. If not assessed, reason: Saman did not endorse SI/HI during the intake and it has never been a problem for her at any point in her life. She also does not have significant risk factors.   Risk Factors: None   Protective factors: Connectedness to individuals, family, community, and social institutions     Suicidal Ideation: Not Present  Self Injurious Behavior:  Not Present  Homicidal Behavior: Not Present  Estimate of Current Risk: Minimal Risk      Plan for Safety-   N/A: risk is assessed to be minimal, therefore developing a safety plan is not indicated at this time.      Interventions  Cognitive Behavior Therapy  We discussed Saman's mood over the past two weeks. She shared that her stress level has significantly increased with upcoming finals. The CBT and anxiety models were discussed and the 4-column technique was introduced. We identified and challenged negative thoughts, and created positive coping statements. We also explored helpful behaviors for stress management (e.g., exploring private tutoring on campus). Diaphragmatic breathing was also introduced.        Psychotropic medications: N/A  Current Outpatient Medications   Medication Sig Dispense Refill   • albuterol HFA (VENTOLIN HFA) 90 mcg/actuation inhaler Inhale 2 puffs every 6 (six) hours as needed for wheezing. 18 g 1   • BLISOVI 24 FE 1 mg-20 mcg (24)/75 mg (4) per tablet Take 1 tablet by mouth once daily.     • doxycycline hyclate (VIBRAMYCIN) 100 mg capsule Take 100 mg by mouth 2 (two) times a day.     • naproxen (NAPROSYN) 500 mg tablet TAKE 1 TABLET BY MOUTH 2 TIMES A DAY WITH MEALS. 180 tablet 0   • valACYclovir (VALTREX) 1 gram tablet TAKE 1 TABLET (1,000 MG TOTAL) BY MOUTH DAILY. 30 tablet 1     No current facility-administered medications for this visit.       ASSESSMENT       Progress  Patient's progress toward their goals is generally remaining the same/slightly decreasing, as she has experienced increased stress with upcoming finals.    Patient's symptomology is unchanged.        PLAN     Goals:  - Decrease anxiety-related symptoms     Recommendations  Individual Therapy  30 minutes 2 times monthly    Next visit plan:  Review 4-column technique entries   Challenge negative thoughts   Discuss cycle of avoidance

## 2021-12-09 NOTE — PATIENT INSTRUCTIONS
4-column technique:  -  On a sheet of paper, draw 3 vertical parallel lines   - In the first column, write down the stressful situation  - In the second column, write down your automatic thoughts (ask yourself: what's going through my mind right now? And what did I just say to myself?)  - In the third column, write down your feelings (emotions and physical symptoms)  - In the fourth column, write down your behaviors (either ones that you are thinking about doing or ones that you have already engaged in, and ask yourself I those behaviors are helpful or unhelpful).     Positive coping statements:    I can do anything if I try hard to finish it  I'm smart and I can do this   I'm motivated to do well     Positive behaviors:    Connect with professors, TAs, etc to inquire about private tutoring   Use squared breathing (slow deep breath in for 4 seconds, hold for 4, exhale for 4, hold for 4. Repeat 3-4 times).

## 2021-12-28 ENCOUNTER — TELEMEDICINE (OUTPATIENT)
Dept: PRIMARY CARE | Facility: CLINIC | Age: 21
End: 2021-12-28
Payer: COMMERCIAL

## 2021-12-28 VITALS
RESPIRATION RATE: 20 BRPM | DIASTOLIC BLOOD PRESSURE: 52 MMHG | BODY MASS INDEX: 24.8 KG/M2 | WEIGHT: 140 LBS | HEART RATE: 78 BPM | SYSTOLIC BLOOD PRESSURE: 112 MMHG | HEIGHT: 63 IN

## 2021-12-28 DIAGNOSIS — U07.1 COVID-19: Primary | ICD-10-CM

## 2021-12-28 DIAGNOSIS — L03.019 PARONYCHIA OF FINGER, UNSPECIFIED LATERALITY: ICD-10-CM

## 2021-12-28 PROBLEM — J40 BRONCHITIS: Status: RESOLVED | Noted: 2021-09-14 | Resolved: 2021-12-28

## 2021-12-28 PROCEDURE — 99213 OFFICE O/P EST LOW 20 MIN: CPT | Mod: 95 | Performed by: NURSE PRACTITIONER

## 2021-12-28 PROCEDURE — 3008F BODY MASS INDEX DOCD: CPT | Performed by: NURSE PRACTITIONER

## 2021-12-28 ASSESSMENT — ENCOUNTER SYMPTOMS
VOMITING: 0
HEADACHES: 0
COUGH: 0
WHEEZING: 0
RHINORRHEA: 1
SWOLLEN GLANDS: 0
SINUS PAIN: 0
SORE THROAT: 1

## 2021-12-28 NOTE — ASSESSMENT & PLAN NOTE
Improved after mild symptoms.  Monitor for now  Quarantine per CDC guidelines.  Follow up as needed.  Schedule PE late spring--needs Tdapo booster.  Can get COVID booster 30 days after infection resolved.

## 2021-12-28 NOTE — LETTER
[facility]  Preventive Exam Fax Back Request   Date:21     To:      FAX#:      From: Majo Howard CRNP/Clinical Team  [facility] [place]  [address] [place] [address], [address]  [place]  [address]  Phone: [phone]    RE:  Jada Holt (: 2000)     We are reaching out to you because our mutual patient, Jada Holt (: 2000), reported they had the preventive procedure(s) indicated below performed at your facility:     • Cervical Cancer Screening (Pap Smear)  •     Please fax the most recent results to our office with this Cover Sheet.  (If no results are found, please check the appropriate box below)  ? - No results found  ? - Results attached    Please fax to: FAX# [phone]    THANK YOU FOR YOUR PARTNERSHIP IN   PROVIDING EXCELLENT CARE TO OUR PATIENTS!      This request is confidential and intended only for the use of the individual or entity to which it is addressed.  It may contain information that is privileged, confidential, and exempt from disclosure.  if the reader of this message is not the intended recipient, you are hereby notified that any dissemination, distribution, or copying of this communication is strictly prohibited.  If you believe you have received this communication in error, please notify us immediately at [phone].

## 2021-12-28 NOTE — ASSESSMENT & PLAN NOTE
Completed 2 course of antibiotics  Feels better--still looks inflamed.  Wash daily with mild soap and water.  Neosporin or Petroleum jelly nightly.  Follow up if symptoms worsen/persist.

## 2021-12-28 NOTE — PROGRESS NOTES
Verification of Patient Location:  The patient affirms they are currently located in the following state: [place]    Request for Consent:    Audio and Video Encounter   Hello, my name is JULIO Mckeon.  Before we proceed, can you please verify your identification by telling me your full name and date of birth?  Can you tell me who is in the room with you?    You and I are about to have a telemedicine check-in or visit because you have requested it.  This is a live video-conference.  I am a real person, speaking to you in real time.  There is no one else with me on the video-conference.  However, when we use ([employer], [employer], etc) it is important for you to know that the video-conference may not be secure or private.  I am not recording this conversation and I am asking you not to record it.  This telemedicine visit will be billed to your health insurance or you, if you are self-insured.  You understand you will be responsible for any copayments or coinsurances that apply to your telemedicine visit.  Communication platform used for this encounter:  [employer] Video Visit (no Zoom)     Before starting our telemedicine visit, I am required to get your consent for this virtual check-in or visit by telemedicine. Do you consent?      Patient Response to Request for Consent:  Yes    Patient Active Problem List   Diagnosis   • Dysmenorrhea   • Benign essential tremor   • Acne vulgaris   • Generalized anxiety disorder   • Non-seasonal allergic rhinitis   • Celiac disease   • Herpes simplex   • COVID-19   • Paronychia of finger     Current Outpatient Medications on File Prior to Visit   Medication Sig Dispense Refill   • albuterol HFA (VENTOLIN HFA) 90 mcg/actuation inhaler Inhale 2 puffs every 6 (six) hours as needed for wheezing. 18 g 1   • BLISOVI 24 FE 1 mg-20 mcg (24)/75 mg (4) per tablet Take 1 tablet by mouth once daily.     • naproxen (NAPROSYN) 500 mg tablet TAKE 1 TABLET BY MOUTH 2 TIMES A DAY WITH MEALS.  180 tablet 0   • valACYclovir (VALTREX) 1 gram tablet TAKE 1 TABLET (1,000 MG TOTAL) BY MOUTH DAILY. 30 tablet 1     No current facility-administered medications on file prior to visit.     Allergies   Allergen Reactions   • Gluten Diarrhea and GI intolerance     Social History     Tobacco Use   • Smoking status: Never Smoker   • Smokeless tobacco: Never Used   Substance Use Topics   • Alcohol use: Not Currently   • Drug use: Never     Health Maintenance   Topic Date Due   • COVID-19 Vaccine (3 - Booster for Moderna series) 12/08/2021   • DTaP, Tdap, and Td Vaccines (7 - Td or Tdap) 05/02/2022 (Originally 11/1/2021)   • Cervical Cancer Screening  06/28/2022 (Originally 10/1/2021)   • Meningococcal ACWY  Completed   • HIB Vaccines  Completed   • IPV Vaccines  Completed   • Influenza Vaccine  Completed   • HPV Vaccines  Completed   • Pneumococcal  Aged Out   • HIV Screening  Discontinued   • Hepatitis C Screening  Discontinued       Visit Documentation:  Subjective     Patient ID: Jada Holt is a 21 y.o. female.  2000      Follow up pneumonia/sepsis  Back in October.  Completely recovered.  Was on IV antibiotics  Did a follow up CXR that was totally clear.  Dx with COVID last week( mom has first)  Mild symptoms that have resolved already.  Plans to continue quarantine per CDC guidelines.  Denies any CP, SOB    URI   This is a new problem. The current episode started in the past 7 days. The problem has been resolved. There has been no fever. Associated symptoms include congestion, rhinorrhea and a sore throat. Pertinent negatives include no chest pain, coughing, ear pain, headaches, sinus pain, sneezing, swollen glands, vomiting or wheezing. She has tried increased fluids and NSAIDs for the symptoms. The treatment provided significant relief.       The following have been reviewed and updated as appropriate in this visit:   Allergies  Meds  Problems       Review of Systems   HENT: Positive for congestion,  rhinorrhea and sore throat. Negative for ear pain, sinus pain and sneezing.    Respiratory: Negative for cough and wheezing.    Cardiovascular: Negative for chest pain.   Gastrointestinal: Negative for vomiting.   Neurological: Negative for headaches.     Vitals:    12/28/21 1008   BP: (!) 112/52   Pulse: 78   Resp: 20         Physical Exam  Constitutional:       General: She is not in acute distress.     Appearance: Normal appearance. She is not diaphoretic.   HENT:      Nose: No congestion or rhinorrhea.   Pulmonary:      Effort: Pulmonary effort is normal. No respiratory distress.      Breath sounds: No stridor. No wheezing.   Neurological:      Mental Status: She is alert and oriented to person, place, and time.   Psychiatric:         Mood and Affect: Mood and affect normal.         Speech: Speech normal.         Behavior: Behavior normal.         Assessment/Plan   Diagnoses and all orders for this visit:    COVID-19 (Primary)  Assessment & Plan:  Improved after mild symptoms.  Monitor for now  Quarantine per CDC guidelines.  Follow up as needed.  Schedule PE late spring--needs Tdapo booster.  Can get COVID booster 30 days after infection resolved.      Paronychia of finger, unspecified laterality  Assessment & Plan:  Completed 2 course of antibiotics  Feels better--still looks inflamed.  Wash daily with mild soap and water.  Neosporin or Petroleum jelly nightly.  Follow up if symptoms worsen/persist.          Time Spent:  I spent 20 minutes on this date of service performing the following activities: obtaining history, performing examination, documenting, preparing for visit, obtaining / reviewing records and providing counseling and education.

## 2022-01-31 NOTE — PROGRESS NOTES
Integrated Behavioral Health Follow-up Visit Note  Visit Type Performed: Audio and Video Encounter   Hello, my name is Sparkle Ruth.  Before we proceed, can you please verify your identification by telling me your full name and date of birth?  Can you tell me who is in the room with you?    You and I are about to have a telemedicine check-in or visit because you have requested it.  This is a live video-conference.  I am a real person, speaking to you in real time.  There is no one else with me on the video-conference.  However, when we use ([employer], [employer], etc) it is important for you to know that the video-conference may not be secure or private.  I am not recording this conversation and I am asking you not to record it.  This telemedicine visit will be billed to your health insurance or you, if you are self-insured.  You understand you will be responsible for any copayments or coinsurances that apply to your telemedicine visit.  Communication platform used for this encounter:  [employer] Video Visit (no Zoom)     Before starting our telemedicine visit, I am required to get your consent for this virtual check-in or visit by telemedicine. Do you consent?    Visit number: 3     Duration: 30 minutes   Jada Holt 2000 a 21 y.o.female, who was contacted today for a behavioral health visit.    Clinician confirmed identification of patient by name and birthdate, provider name, location of patient and clinician, and callback number in case disconnected.  This visit was conducted via telehealth/telephone in lieu of an in-person visit due to precautions related to the COVID-19 pandemic.  Verification of Patient Location: The patient affirms they are currently located in the following state: [place]     Request for Consent: You and I are about to have a tele-health check-in or visit. This is allowed because you are already a patient of our [facility] practice, and you have requested it.  Before starting our  telemedicine visit, I am required to get your consent for this virtual check-in or visit by tele-health . Do you consent?    Patient Response to Request for Consent: Yes    SUBJECTIVE     Reason for visit: Follow-up for management of anxiety-related symptoms     Symptoms  Depression symptoms: none   Anxiety symptoms: minimal stress reported  Additional reported symptoms: none       OBJECTIVE     Mental Status Evaluation  Patient's mood and affect were consistent with the context, and consistent with their baseline: Yes   Comments:  presented as calm and pleasant, alert, oriented, in no apparent distress with a full range of affect, normal behavior, and no psychotic features.      Additional Assessments completed this visit  N/A      Suicidal Ideation/Homicidal Ideation Risk Assessment: not assessed. If not assessed, reason: Saman did not endorse SI/HI during the intake and it has never been a problem for her at any point in her life. She also does not have significant risk factors.   Risk Factors: None   Protective factors: Connectedness to individuals, family, community, and social institutions  Estimate of Current Risk: Minimal Risk     Plan for Safety-   N/A: risk is assessed to be minimal, therefore developing a safety plan is not indicated at this time.      Interventions  Cognitive Behavior Therapy  We discussed Saman's mood over the past month. She shared that she has changed her college major/career interests, which has reduced a significant amount of stress. We discussed her decision-making and she reflected on how she is much happier with this change. Minimal stressors with school and extracurricular activities were also discussed. We reviewed stress management strategies including diaphragmatic breathing and engaging in distractor behaviors.     Psychotropic medications: N/A   Current Outpatient Medications   Medication Sig Dispense Refill   • albuterol HFA (VENTOLIN HFA) 90 mcg/actuation inhaler Inhale 2  puffs every 6 (six) hours as needed for wheezing. 18 g 1   • BLISOVI 24 FE 1 mg-20 mcg (24)/75 mg (4) per tablet Take 1 tablet by mouth once daily.     • naproxen (NAPROSYN) 500 mg tablet TAKE 1 TABLET BY MOUTH 2 TIMES A DAY WITH MEALS. 180 tablet 0   • valACYclovir (VALTREX) 1 gram tablet TAKE 1 TABLET (1,000 MG TOTAL) BY MOUTH DAILY. 30 tablet 1     No current facility-administered medications for this visit.       ASSESSMENT       Progress  Patient's progress toward their goals is generally improving, as she has eliminated a large stressor at school by changing her majors. She reported a significant improvement in her mood as a result.   Patient's symptomology is gradually improving       PLAN        Goals:  - Decrease anxiety-related symptoms      Recommendations  Individual Therapy  30 minutes 2 times monthly    Next visit plan:  Discuss effectiveness of stress management strategies  Discuss her interest in continued brief therapy

## 2022-02-01 ENCOUNTER — TELEMEDICINE (OUTPATIENT)
Dept: PRIMARY CARE | Facility: CLINIC | Age: 22
End: 2022-02-01
Payer: COMMERCIAL

## 2022-02-01 DIAGNOSIS — F41.8 OTHER SPECIFIED ANXIETY DISORDERS: Primary | ICD-10-CM

## 2022-05-10 ENCOUNTER — TELEMEDICINE (OUTPATIENT)
Dept: URGENT CARE | Facility: CLINIC | Age: 22
End: 2022-05-10
Attending: NURSE PRACTITIONER

## 2022-05-10 DIAGNOSIS — B30.9 VIRAL CONJUNCTIVITIS: Primary | ICD-10-CM

## 2022-05-10 PROCEDURE — UCTELM PB URGENT CARE VIDEO VISIT: Mod: 95 | Performed by: NURSE PRACTITIONER

## 2022-05-10 ASSESSMENT — ENCOUNTER SYMPTOMS
EYE ITCHING: 1
EYE PAIN: 1
HEADACHES: 0
EYE REDNESS: 1
CHILLS: 0
NAUSEA: 0
PHOTOPHOBIA: 0
SORE THROAT: 1
RHINORRHEA: 1
SHORTNESS OF BREATH: 0
COUGH: 1
FEVER: 0
EYE DISCHARGE: 1
DIZZINESS: 0

## 2022-05-10 NOTE — PROGRESS NOTES
Verification of Patient Location:  The patient affirms they are currently located in the following state: [place]    Request for Consent:    Audio and Video Encounter   Hello, my name is [facility].  Before we proceed, can you please verify your identification by telling me your full name and date of birth?  Can you tell me who is in the room with you?    You and I are about to have a telemedicine check-in or visit because you have requested it.  This is a live video-conference.  I am a real person, speaking to you in real time.  There is no one else with me on the video-conference.  However, when we use ([employer], [employer], etc) it is important for you to know that the video-conference may not be secure or private.  I am not recording this conversation and I am asking you not to record it.  This telemedicine visit will be billed to your health insurance or you, if you are self-insured.  You understand you will be responsible for any copayments or coinsurances that apply to your telemedicine visit.  Communication platform used for this encounter:  [employer] Video Visit (with Zoom integration)     Before starting our telemedicine visit, I am required to get your consent for this virtual check-in or visit by telemedicine. Do you consent?      Patient Response to Request for Consent:  Yes    Visit Documentation:    Subjective     Patient ID: Jada Holt is a 21 y.o. female.  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   HENT: Positive for congestion, rhinorrhea and sore throat.    Eyes: Positive for pain, discharge, redness and itching. Negative for photophobia and visual disturbance.   Respiratory: Positive for cough. Negative for shortness of breath.    Gastrointestinal: Negative for nausea.   Neurological: Negative for dizziness and headaches.         Assessment/Plan   Diagnoses and all orders for this visit:    Viral conjunctivitis (Primary)    MDM:  She is a well-appearing 22yo F.  Sounds congested during our visit, but appears non-toxic.  I feel her eye sxs are viral and should improve over the next week.  Warm compresses and OTC eye drops may be helpful.  Strict hand hygiene encouraged.  F/U in person or with ophthalmologist if sxs worsen or do not improve > 7-10 days.  All questions answered during our video visit.    Time Spent:  I spent 15 minutes on this date of service performing the following activities: obtaining history, documenting and providing counseling and education.

## 2022-05-19 ENCOUNTER — TELEMEDICINE (OUTPATIENT)
Dept: PRIMARY CARE | Facility: CLINIC | Age: 22
End: 2022-05-19
Payer: COMMERCIAL

## 2022-05-19 DIAGNOSIS — J06.9 VIRAL UPPER RESPIRATORY TRACT INFECTION: Primary | ICD-10-CM

## 2022-05-19 DIAGNOSIS — R50.9 FEVER, UNSPECIFIED FEVER CAUSE: ICD-10-CM

## 2022-05-19 PROBLEM — U07.1 COVID-19: Status: RESOLVED | Noted: 2021-12-28 | Resolved: 2022-05-19

## 2022-05-19 PROBLEM — L03.019 PARONYCHIA OF FINGER: Status: RESOLVED | Noted: 2021-12-28 | Resolved: 2022-05-19

## 2022-05-19 PROCEDURE — 87637 SARSCOV2&INF A&B&RSV AMP PRB: CPT | Performed by: NURSE PRACTITIONER

## 2022-05-19 PROCEDURE — 99213 OFFICE O/P EST LOW 20 MIN: CPT | Mod: 95 | Performed by: NURSE PRACTITIONER

## 2022-05-19 RX ORDER — CEFDINIR 300 MG/1
CAPSULE ORAL
COMMUNITY
Start: 2022-05-14 | End: 2023-08-16 | Stop reason: ALTCHOICE

## 2022-05-19 ASSESSMENT — ENCOUNTER SYMPTOMS
SHORTNESS OF BREATH: 0
HEARTBURN: 0
WHEEZING: 0
COUGH: 1
SORE THROAT: 0
HEADACHES: 0
SWEATS: 0
MYALGIAS: 1
RHINORRHEA: 0
HEMOPTYSIS: 0
WEIGHT LOSS: 0
CHILLS: 1
FEVER: 1

## 2022-05-19 ASSESSMENT — PATIENT HEALTH QUESTIONNAIRE - PHQ9: SUM OF ALL RESPONSES TO PHQ9 QUESTIONS 1 & 2: 0

## 2022-05-19 NOTE — PROGRESS NOTES
Verification of Patient Location:  The patient affirms they are currently located in the following state: [place]    Request for Consent:    Audio and Video Encounter   Hello, my name is JULIO Mckeon.  Before we proceed, can you please verify your identification by telling me your full name and date of birth?  Can you tell me who is in the room with you?    You and I are about to have a telemedicine check-in or visit because you have requested it.  This is a live video-conference.  I am a real person, speaking to you in real time.  There is no one else with me on the video-conference.  However, when we use ([employer], [employer], etc) it is important for you to know that the video-conference may not be secure or private.  I am not recording this conversation and I am asking you not to record it.  This telemedicine visit will be billed to your health insurance or you, if you are self-insured.  You understand you will be responsible for any copayments or coinsurances that apply to your telemedicine visit.  Communication platform used for this encounter:  [employer] Video Visit (no Zoom)     Before starting our telemedicine visit, I am required to get your consent for this virtual check-in or visit by telemedicine. Do you consent?      Patient Response to Request for Consent:  Yes    Patient Active Problem List   Diagnosis   • Dysmenorrhea   • Benign essential tremor   • Acne vulgaris   • Generalized anxiety disorder   • Non-seasonal allergic rhinitis   • Celiac disease   • Herpes simplex   • Fever   • Viral upper respiratory tract infection     Current Outpatient Medications on File Prior to Visit   Medication Sig Dispense Refill   • albuterol HFA (VENTOLIN HFA) 90 mcg/actuation inhaler Inhale 2 puffs every 6 (six) hours as needed for wheezing. 18 g 1   • BLISOVI 24 FE 1 mg-20 mcg (24)/75 mg (4) per tablet Take 1 tablet by mouth once daily.     • cefdinir (OMNICEF) 300 mg capsule TAKE ONE CAPSULE BY MOUTH TWO TIMES  A DAY FOR 10 DAYS. TAKE WITH FOOD AND FLUID     • naproxen (NAPROSYN) 500 mg tablet TAKE 1 TABLET BY MOUTH 2 TIMES A DAY WITH MEALS. 180 tablet 0   • valACYclovir (VALTREX) 1 gram tablet TAKE 1 TABLET (1,000 MG TOTAL) BY MOUTH DAILY. 30 tablet 1     No current facility-administered medications on file prior to visit.     Allergies   Allergen Reactions   • Gluten Diarrhea and GI intolerance     Social History     Tobacco Use   • Smoking status: Never Smoker   • Smokeless tobacco: Never Used   Substance Use Topics   • Alcohol use: Not Currently   • Drug use: Never     Health Maintenance   Topic Date Due   • DTaP, Tdap, and Td Vaccines (7 - Td or Tdap) 11/01/2021   • Cervical Cancer Screening  06/28/2022 (Originally 10/1/2021)   • Zoster Vaccine (1 of 2) 10/01/2050   • Meningococcal ACWY  Completed   • HIB Vaccines  Completed   • IPV Vaccines  Completed   • Influenza Vaccine  Completed   • HPV Vaccines  Completed   • COVID-19 Vaccine  Completed   • Pneumococcal  Aged Out   • HIV Screening  Discontinued   • Hepatitis C Screening  Discontinued       Visit Documentation:  Subjective     Patient ID: Jada Holt is a 21 y.o. female.  2000      Came home from college 2 weeks ago  Seen at   Dx with URI  COVID and flu negative  Given antibiotic if she got worse.  Then got pink eye.  Better now.  Started Cefdinir--4-5 days ago.  Now coughing a lot  Body aches  Temp 101( may be skewed--was on heating pad)  Worried about pneumonia( had last fall with sepsis)    Cough  This is a new problem. The current episode started 1 to 4 weeks ago. The problem has been gradually worsening. The problem occurs every few minutes. The cough is non-productive. Associated symptoms include chills, a fever, myalgias, nasal congestion and postnasal drip. Pertinent negatives include no chest pain, ear congestion, ear pain, headaches, heartburn, hemoptysis, rhinorrhea, sore throat, shortness of breath, sweats, weight loss or wheezing. Nothing  aggravates the symptoms. Treatments tried: antibiotics. The treatment provided no relief. Her past medical history is significant for pneumonia.       The following have been reviewed and updated as appropriate in this visit:   Allergies  Meds  Problems         Review of Systems   Constitutional: Positive for chills and fever. Negative for weight loss.   HENT: Positive for postnasal drip. Negative for ear pain, rhinorrhea and sore throat.    Respiratory: Positive for cough. Negative for hemoptysis, shortness of breath and wheezing.    Cardiovascular: Negative for chest pain.   Gastrointestinal: Negative for heartburn.   Musculoskeletal: Positive for myalgias.   Neurological: Negative for headaches.     Physical Exam  Constitutional:       General: She is not in acute distress.     Appearance: Normal appearance. She is ill-appearing. She is not toxic-appearing or diaphoretic.   HENT:      Nose: Congestion present.   Pulmonary:      Effort: Pulmonary effort is normal. No respiratory distress.      Breath sounds: No stridor. No wheezing.   Neurological:      Mental Status: She is alert and oriented to person, place, and time.   Psychiatric:         Mood and Affect: Mood and affect normal.         Speech: Speech normal.         Behavior: Behavior normal.         Assessment/Plan   Diagnoses and all orders for this visit:    Viral upper respiratory tract infection (Primary)  Assessment & Plan:  COVID/flu swab today.  If negative-- get CXR.  Continue antibiotic for now.  Push po fluids  Treat fever with Tylenol or Ibuprofen.  Report any worsening symptoms.    Orders:  -     COVID- 19 PCR Symptomatic (includes FLU A/B & RSV) - [facility]; Future    Fever, unspecified fever cause  Assessment & Plan:  See plan under URI.    Orders:  -     X-RAY CHEST 2 VIEWS; Future  -     COVID- 19 PCR Symptomatic (includes FLU A/B & RSV) - [facility]; Future      Time Spent:  I spent 21 minutes on this date of service performing the following  activities: obtaining history, performing examination, entering orders, documenting, preparing for visit, obtaining / reviewing records and providing counseling and education.

## 2022-05-19 NOTE — ASSESSMENT & PLAN NOTE
COVID/flu swab today.  If negative-- get CXR.  Continue antibiotic for now.  Push po fluids  Treat fever with Tylenol or Ibuprofen.  Report any worsening symptoms.

## 2022-05-20 ENCOUNTER — TELEPHONE (OUTPATIENT)
Dept: PRIMARY CARE | Facility: CLINIC | Age: 22
End: 2022-05-20
Payer: COMMERCIAL

## 2022-05-20 ENCOUNTER — HOSPITAL ENCOUNTER (OUTPATIENT)
Dept: RADIOLOGY | Age: 22
Discharge: HOME | End: 2022-05-20
Attending: NURSE PRACTITIONER
Payer: COMMERCIAL

## 2022-05-20 DIAGNOSIS — R50.9 FEVER, UNSPECIFIED FEVER CAUSE: ICD-10-CM

## 2022-05-20 PROCEDURE — 71046 X-RAY EXAM CHEST 2 VIEWS: CPT

## 2022-05-20 NOTE — TELEPHONE ENCOUNTER
Pt called about her lab results. I read her doctors note and she said she will get xray today and call us back.

## 2022-08-02 ENCOUNTER — TELEPHONE (OUTPATIENT)
Dept: PRIMARY CARE | Facility: CLINIC | Age: 22
End: 2022-08-02
Payer: COMMERCIAL

## 2022-08-02 DIAGNOSIS — Z23 NEED FOR PNEUMOCOCCAL VACCINE: Primary | ICD-10-CM

## 2022-08-02 NOTE — TELEPHONE ENCOUNTER
Patient needs an order for prevnar 20.  Patient supposedly had pneumonia twice in the last 6 mos.

## 2022-08-12 ENCOUNTER — HOSPITAL ENCOUNTER (OUTPATIENT)
Dept: RADIOLOGY | Age: 22
Discharge: HOME | End: 2022-08-12
Attending: INTERNAL MEDICINE
Payer: COMMERCIAL

## 2022-08-12 ENCOUNTER — TRANSCRIBE ORDERS (OUTPATIENT)
Dept: PULMONOLOGY | Facility: HOSPITAL | Age: 22
End: 2022-08-12

## 2022-08-12 ENCOUNTER — CLINICAL SUPPORT (OUTPATIENT)
Dept: PRIMARY CARE | Facility: CLINIC | Age: 22
End: 2022-08-12
Payer: COMMERCIAL

## 2022-08-12 DIAGNOSIS — J18.9 PNEUMONIA, UNSPECIFIED ORGANISM: Primary | ICD-10-CM

## 2022-08-12 DIAGNOSIS — J18.9 PNEUMONIA, UNSPECIFIED ORGANISM: ICD-10-CM

## 2022-08-12 DIAGNOSIS — Z23 NEED FOR PNEUMOCOCCAL VACCINE: ICD-10-CM

## 2022-08-12 PROCEDURE — 71046 X-RAY EXAM CHEST 2 VIEWS: CPT

## 2022-08-12 PROCEDURE — 90677 PCV20 VACCINE IM: CPT | Performed by: NURSE PRACTITIONER

## 2022-08-12 PROCEDURE — 90471 IMMUNIZATION ADMIN: CPT | Performed by: NURSE PRACTITIONER

## 2022-08-15 ENCOUNTER — TRANSCRIBE ORDERS (OUTPATIENT)
Dept: SCHEDULING | Age: 22
End: 2022-08-15

## 2022-08-15 DIAGNOSIS — J18.9 PNEUMONIA, UNSPECIFIED ORGANISM: Primary | ICD-10-CM

## 2022-08-16 ENCOUNTER — HOSPITAL ENCOUNTER (OUTPATIENT)
Dept: RADIOLOGY | Age: 22
Discharge: HOME | End: 2022-08-16
Attending: SURGERY
Payer: COMMERCIAL

## 2022-08-16 DIAGNOSIS — J18.9 PNEUMONIA, UNSPECIFIED ORGANISM: ICD-10-CM

## 2022-08-16 PROCEDURE — 71250 CT THORAX DX C-: CPT

## 2022-11-21 ENCOUNTER — TRANSCRIBE ORDERS (OUTPATIENT)
Dept: SCHEDULING | Age: 22
End: 2022-11-21

## 2022-11-21 DIAGNOSIS — J18.9 PNEUMONIA, UNSPECIFIED ORGANISM: Primary | ICD-10-CM

## 2022-11-25 ENCOUNTER — HOSPITAL ENCOUNTER (OUTPATIENT)
Dept: RADIOLOGY | Facility: HOSPITAL | Age: 22
Discharge: HOME | End: 2022-11-25
Attending: INTERNAL MEDICINE
Payer: COMMERCIAL

## 2022-11-25 DIAGNOSIS — J18.9 PNEUMONIA, UNSPECIFIED ORGANISM: ICD-10-CM

## 2022-11-25 PROCEDURE — 71250 CT THORAX DX C-: CPT

## 2023-01-11 DIAGNOSIS — B00.9 HERPES SIMPLEX: ICD-10-CM

## 2023-01-11 RX ORDER — VALACYCLOVIR HYDROCHLORIDE 1 G/1
1000 TABLET, FILM COATED ORAL DAILY
Qty: 30 TABLET | Refills: 1 | Status: SHIPPED | OUTPATIENT
Start: 2023-01-11 | End: 2023-03-06

## 2023-01-11 NOTE — TELEPHONE ENCOUNTER
Medicine Refill Request    Last Office: 11/23/2021   Last Consult Visit: Visit date not found  Last Telemedicine Visit: 5/19/2022 Majo Howard CRNP    Next Appointment: Visit date not found    Currently have a huge cold sore break out (few on lips)      Current Outpatient Medications:   •  albuterol HFA (VENTOLIN HFA) 90 mcg/actuation inhaler, Inhale 2 puffs every 6 (six) hours as needed for wheezing., Disp: 18 g, Rfl: 1  •  BLISOVI 24 FE 1 mg-20 mcg (24)/75 mg (4) per tablet, Take 1 tablet by mouth once daily., Disp: , Rfl:   •  cefdinir (OMNICEF) 300 mg capsule, TAKE ONE CAPSULE BY MOUTH TWO TIMES A DAY FOR 10 DAYS. TAKE WITH FOOD AND FLUID, Disp: , Rfl:   •  naproxen (NAPROSYN) 500 mg tablet, TAKE 1 TABLET BY MOUTH 2 TIMES A DAY WITH MEALS., Disp: 180 tablet, Rfl: 0  •  valACYclovir (VALTREX) 1 gram tablet, TAKE 1 TABLET (1,000 MG TOTAL) BY MOUTH DAILY., Disp: 30 tablet, Rfl: 1      BP Readings from Last 3 Encounters:   12/28/21 (!) 112/52   08/19/21 110/60   01/13/21 120/82       Recent Lab results:  No results found for: CHOL, No results found for: HDL, No results found for: LDLCALC, No results found for: TRIG     No results found for: GLUCOSE, No results found for: HGBA1C      No results found for: CREATININE    No results found for: TSH

## 2023-03-06 DIAGNOSIS — B00.9 HERPES SIMPLEX: ICD-10-CM

## 2023-03-06 RX ORDER — VALACYCLOVIR HYDROCHLORIDE 1 G/1
TABLET, FILM COATED ORAL
Qty: 30 TABLET | Refills: 1 | Status: SHIPPED | OUTPATIENT
Start: 2023-03-06

## 2023-03-06 NOTE — TELEPHONE ENCOUNTER
Medicine Refill Request    Last Office: 11/23/2021   Last Consult Visit: Visit date not found  Last Telemedicine Visit: 5/19/2022 Majo Howard CRNP    Next Appointment: Visit date not found      Current Outpatient Medications:   •  albuterol HFA (VENTOLIN HFA) 90 mcg/actuation inhaler, Inhale 2 puffs every 6 (six) hours as needed for wheezing., Disp: 18 g, Rfl: 1  •  BLISOVI 24 FE 1 mg-20 mcg (24)/75 mg (4) per tablet, Take 1 tablet by mouth once daily., Disp: , Rfl:   •  cefdinir (OMNICEF) 300 mg capsule, TAKE ONE CAPSULE BY MOUTH TWO TIMES A DAY FOR 10 DAYS. TAKE WITH FOOD AND FLUID, Disp: , Rfl:   •  naproxen (NAPROSYN) 500 mg tablet, TAKE 1 TABLET BY MOUTH 2 TIMES A DAY WITH MEALS., Disp: 180 tablet, Rfl: 0  •  valACYclovir (VALTREX) 1 gram tablet, Take 1 tablet (1,000 mg total) by mouth daily., Disp: 30 tablet, Rfl: 1      BP Readings from Last 3 Encounters:   12/28/21 (!) 112/52   08/19/21 110/60   01/13/21 120/82       Recent Lab results:  No results found for: CHOL, No results found for: HDL, No results found for: LDLCALC, No results found for: TRIG     No results found for: GLUCOSE, No results found for: HGBA1C      No results found for: CREATININE    No results found for: TSH

## 2023-05-23 ENCOUNTER — TRANSCRIBE ORDERS (OUTPATIENT)
Dept: SCHEDULING | Age: 23
End: 2023-05-23

## 2023-05-23 DIAGNOSIS — R91.8 OTHER NONSPECIFIC ABNORMAL FINDING OF LUNG FIELD: ICD-10-CM

## 2023-05-25 ENCOUNTER — HOSPITAL ENCOUNTER (OUTPATIENT)
Dept: RADIOLOGY | Facility: HOSPITAL | Age: 23
Discharge: HOME | End: 2023-05-25
Attending: INTERNAL MEDICINE
Payer: COMMERCIAL

## 2023-05-25 DIAGNOSIS — R91.8 OTHER NONSPECIFIC ABNORMAL FINDING OF LUNG FIELD: ICD-10-CM

## 2023-05-25 PROCEDURE — 71250 CT THORAX DX C-: CPT

## 2023-06-08 ENCOUNTER — TRANSCRIBE ORDERS (OUTPATIENT)
Dept: SCHEDULING | Age: 23
End: 2023-06-08

## 2023-06-08 DIAGNOSIS — J45.40 MODERATE PERSISTENT ASTHMA, UNCOMPLICATED: Primary | ICD-10-CM

## 2023-06-12 ENCOUNTER — HOSPITAL ENCOUNTER (OUTPATIENT)
Dept: PULMONOLOGY | Facility: HOSPITAL | Age: 23
Discharge: HOME | End: 2023-06-12
Attending: INTERNAL MEDICINE
Payer: COMMERCIAL

## 2023-06-12 DIAGNOSIS — J45.40 MODERATE PERSISTENT ASTHMA, UNCOMPLICATED: ICD-10-CM

## 2023-06-12 PROCEDURE — 94070 EVALUATION OF WHEEZING: CPT

## 2023-06-12 PROCEDURE — 25000014 HC MANNITOL CHALLENGE KIT

## 2023-06-12 PROCEDURE — 95070 INHLJ BRNCL CHALLENGE TSTG: CPT

## 2023-06-12 PROCEDURE — 25000000 HC PHARMACY GENERAL: Performed by: INTERNAL MEDICINE

## 2023-06-12 RX ORDER — ALBUTEROL SULFATE 0.83 MG/ML
2.5 SOLUTION RESPIRATORY (INHALATION) ONCE
Status: COMPLETED | OUTPATIENT
Start: 2023-06-12 | End: 2023-06-12

## 2023-06-12 RX ADMIN — ALBUTEROL SULFATE 2.5 MG: 2.5 SOLUTION RESPIRATORY (INHALATION) at 13:33

## 2023-08-16 ENCOUNTER — OFFICE VISIT (OUTPATIENT)
Dept: PRIMARY CARE | Facility: CLINIC | Age: 23
End: 2023-08-16
Payer: COMMERCIAL

## 2023-08-16 VITALS
TEMPERATURE: 97.9 F | OXYGEN SATURATION: 99 % | SYSTOLIC BLOOD PRESSURE: 104 MMHG | HEIGHT: 63 IN | DIASTOLIC BLOOD PRESSURE: 82 MMHG | WEIGHT: 149.4 LBS | BODY MASS INDEX: 26.47 KG/M2 | RESPIRATION RATE: 18 BRPM | HEART RATE: 75 BPM

## 2023-08-16 DIAGNOSIS — K90.0 CELIAC DISEASE: ICD-10-CM

## 2023-08-16 DIAGNOSIS — Z00.00 ROUTINE PHYSICAL EXAMINATION: Primary | ICD-10-CM

## 2023-08-16 DIAGNOSIS — N94.6 DYSMENORRHEA: ICD-10-CM

## 2023-08-16 DIAGNOSIS — F41.1 GENERALIZED ANXIETY DISORDER: ICD-10-CM

## 2023-08-16 DIAGNOSIS — Z23 NEED FOR VACCINATION: ICD-10-CM

## 2023-08-16 DIAGNOSIS — B00.9 HERPES SIMPLEX: ICD-10-CM

## 2023-08-16 DIAGNOSIS — J45.40 MODERATE PERSISTENT ASTHMA WITHOUT COMPLICATION: ICD-10-CM

## 2023-08-16 DIAGNOSIS — G25.0 BENIGN ESSENTIAL TREMOR: ICD-10-CM

## 2023-08-16 PROBLEM — R50.9 FEVER: Status: RESOLVED | Noted: 2022-05-19 | Resolved: 2023-08-16

## 2023-08-16 PROBLEM — J06.9 VIRAL UPPER RESPIRATORY TRACT INFECTION: Status: RESOLVED | Noted: 2022-05-19 | Resolved: 2023-08-16

## 2023-08-16 PROCEDURE — 3008F BODY MASS INDEX DOCD: CPT | Performed by: NURSE PRACTITIONER

## 2023-08-16 PROCEDURE — 90715 TDAP VACCINE 7 YRS/> IM: CPT | Performed by: NURSE PRACTITIONER

## 2023-08-16 PROCEDURE — 99395 PREV VISIT EST AGE 18-39: CPT | Mod: 25 | Performed by: NURSE PRACTITIONER

## 2023-08-16 PROCEDURE — 90471 IMMUNIZATION ADMIN: CPT | Performed by: NURSE PRACTITIONER

## 2023-08-16 RX ORDER — PROPRANOLOL HYDROCHLORIDE 10 MG/1
10 TABLET ORAL 3 TIMES DAILY PRN
Qty: 30 TABLET | Refills: 2 | Status: SHIPPED | OUTPATIENT
Start: 2023-08-16 | End: 2024-05-21 | Stop reason: ALTCHOICE

## 2023-08-16 ASSESSMENT — ENCOUNTER SYMPTOMS
GASTROINTESTINAL NEGATIVE: 1
HEMATOLOGIC/LYMPHATIC NEGATIVE: 1
MUSCULOSKELETAL NEGATIVE: 1
ENDOCRINE NEGATIVE: 1
CARDIOVASCULAR NEGATIVE: 1
NEUROLOGICAL NEGATIVE: 1
EYES NEGATIVE: 1
PSYCHIATRIC NEGATIVE: 1
CONSTITUTIONAL NEGATIVE: 1
RESPIRATORY NEGATIVE: 1

## 2023-08-16 ASSESSMENT — PATIENT HEALTH QUESTIONNAIRE - PHQ9: SUM OF ALL RESPONSES TO PHQ9 QUESTIONS 1 & 2: 0

## 2023-08-16 ASSESSMENT — PAIN SCALES - GENERAL: PAINLEVEL: 0-NO PAIN

## 2023-08-16 NOTE — LETTER
[facility]  Preventive Exam Fax Back Request   Date:23     To:      FAX#:      From: Majo Howard CRNP/Clinical Team  [facility] [place]  [address] [place] [address], [address]  [place]  [address]  Phone: [phone]    RE:  Jada Holt (: 2000)     We are reaching out to you because mutual patient, Jada Holt (: 2000), reported they had the preventive procedure(s) indicated below performed at your facility:     Cervical Cancer Screening (Pap Smear)      Please fax the most recent results to our office with this Cover Sheet.  (If no results are found, please check the appropriate box below)  ? - No results found  ? - Results attached    Please fax to: FAX# [phone]    THANK YOU FOR YOUR PARTNERSHIP IN   PROVIDING EXCELLENT CARE TO OUR PATIENTS!      This request is confidential and intended only for the use of the individual or entity to which it is addressed.  It may contain information that is privileged, confidential, and exempt from disclosure.  if the reader of this message is not the intended recipient, you are hereby notified that any dissemination, distribution, or copying of this communication is strictly prohibited.  If you believe you have received this communication in error, please notify us immediately at [phone].

## 2023-08-16 NOTE — PROGRESS NOTES
[facility] [place]  [place] [address]  [facility] [address]  [phone]  Fax [phone]      Patient ID: Jada Holt                              : 2000    Visit Date: 2023    Chief Complaint: Annual Exam      Current Outpatient Medications   Medication Sig Dispense Refill   • BLISOVI 24 FE 1 mg-20 mcg (24)/75 mg (4) per tablet Take 1 tablet by mouth once daily.     • propranoloL (INDERAL) 10 mg tablet Take 1 tablet (10 mg total) by mouth 3 (three) times a day as needed (benign tremors). 30 tablet 2   • valACYclovir (VALTREX) 1 gram tablet TAKE 1 TABLET BY MOUTH EVERY DAY 30 tablet 1   • albuterol HFA (VENTOLIN HFA) 90 mcg/actuation inhaler Inhale 2 puffs every 6 (six) hours as needed for wheezing. 18 g 1     No current facility-administered medications for this visit.       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 [employer]        Allergies   Allergen Reactions   • Gluten Diarrhea and GI intolerance       Past Surgical History:   Procedure Laterality Date   • WISDOM TOOTH EXTRACTION         Past Medical History:   Diagnosis Date   • COVID-19 10/2020       Health Maintenance   Topic Date Due   • Cervical Cancer Screening  Never done   • COVID-19 Vaccine (4 - Moderna series) 2022   • Influenza Vaccine (1) 2023   • Depression Screening  2024   • DTaP, Tdap, and Td Vaccines (8 - Td or Tdap) 2033   • Zoster Vaccine (1 of 2) 10/01/2050   • Meningococcal ACWY  Completed   • HIB Vaccines  Completed   • Hepatitis B Vaccines  Completed   • IPV Vaccines  Completed   • HPV Vaccines  Completed   • Pneumococcal  Completed   • HIV Screening  Discontinued   • Hepatitis C Screening  Discontinued       Social History     Socioeconomic History   • Marital status: Single     Spouse name: None   • Number of children: None   • Years of education: None   • Highest education level: None   Occupational History   • Occupation: student     Comment:  "[employer]   Tobacco Use   • Smoking status: Never   • Smokeless tobacco: Never   Substance and Sexual Activity   • Alcohol use: Not Currently   • Drug use: Never   • Sexual activity: Not Currently       No family history on file.    Review Of Systems  Review of Systems   Constitutional: Negative.    HENT: Negative.    Eyes: Negative.    Respiratory: Negative.    Cardiovascular: Negative.    Gastrointestinal: Negative.    Endocrine: Negative.    Genitourinary: Negative.    Musculoskeletal: Negative.    Skin: Negative.    Allergic/Immunologic: Positive for environmental allergies and food allergies.   Neurological: Negative.    Hematological: Negative.    Psychiatric/Behavioral: Negative.         Vitals:    08/16/23 1419   BP: 104/82   BP Location: Left upper arm   Patient Position: Sitting   Pulse: 75   Resp: 18   Temp: 36.6 °C (97.9 °F)   TempSrc: Temporal   SpO2: 99%   Weight: 67.8 kg (149 lb 6.4 oz)   Height: 1.6 m (5' 3\")       Body mass index is 26.47 kg/m².      Physical Exam  Vitals reviewed.   Constitutional:       General: She is not in acute distress.     Appearance: Normal appearance. She is not ill-appearing, toxic-appearing or diaphoretic.   HENT:      Head: Normocephalic.      Right Ear: Tympanic membrane, ear canal and external ear normal.      Left Ear: Tympanic membrane, ear canal and external ear normal.      Nose: Nose normal.      Mouth/Throat:      Mouth: Mucous membranes are moist.      Pharynx: Oropharynx is clear. No oropharyngeal exudate or posterior oropharyngeal erythema.   Eyes:      General:         Right eye: No discharge.         Left eye: No discharge.      Extraocular Movements: Extraocular movements intact.      Conjunctiva/sclera: Conjunctivae normal.      Pupils: Pupils are equal, round, and reactive to light.   Neck:      Thyroid: No thyromegaly.   Cardiovascular:      Rate and Rhythm: Normal rate and regular rhythm.      Heart sounds: No murmur heard.     No friction rub. No " gallop.   Pulmonary:      Effort: Pulmonary effort is normal.      Breath sounds: Normal breath sounds. No wheezing, rhonchi or rales.   Abdominal:      General: Bowel sounds are normal. There is no distension.      Palpations: Abdomen is soft. There is no mass.      Tenderness: There is no abdominal tenderness. There is no right CVA tenderness or left CVA tenderness.   Musculoskeletal:      Cervical back: Neck supple. No rigidity or tenderness.      Right lower leg: No edema.      Left lower leg: No edema.   Lymphadenopathy:      Cervical: No cervical adenopathy.   Skin:     General: Skin is warm and dry.      Coloration: Skin is not pale.      Findings: No erythema or rash.   Neurological:      General: No focal deficit present.      Mental Status: She is alert and oriented to person, place, and time.      Gait: Gait normal.      Deep Tendon Reflexes: Reflexes normal.   Psychiatric:         Mood and Affect: Mood and affect normal.         Speech: Speech normal.         Behavior: Behavior normal.         Assessment/Plan     Problem List Items Addressed This Visit     Dysmenorrhea    Current Assessment & Plan     Stable on OCs         Benign essential tremor    Current Assessment & Plan     Propranolol PRN         Relevant Medications    propranoloL (INDERAL) 10 mg tablet    Generalized anxiety disorder    Current Assessment & Plan     Does not want med  Utilize relaxation techniques--deep breathing, exercise, meditation.  May use Propranolol PRN for public speaking.         Celiac disease    Current Assessment & Plan     Managed with diet.  Stable.         Herpes simplex    Current Assessment & Plan     Prn Valtrex.         Moderate persistent asthma without complication    Current Assessment & Plan     No meds  Stable.           Routine physical examination - Primary    Current Assessment & Plan     NL exam.  Get flu shot in fall.  UTD with GYN care.        Other Visit Diagnoses     Need for vaccination         Relevant Orders    Tdap vaccine greater than or equal to 8yo IM (Completed)                  JULIO Mckeon  8/16/2023

## 2023-08-16 NOTE — ASSESSMENT & PLAN NOTE
Does not want med  Utilize relaxation techniques--deep breathing, exercise, meditation.  May use Propranolol PRN for public speaking.

## 2024-05-21 ENCOUNTER — OFFICE VISIT (OUTPATIENT)
Dept: PRIMARY CARE | Facility: CLINIC | Age: 24
End: 2024-05-21
Payer: COMMERCIAL

## 2024-05-21 VITALS
HEART RATE: 86 BPM | TEMPERATURE: 97.8 F | OXYGEN SATURATION: 96 % | RESPIRATION RATE: 18 BRPM | WEIGHT: 154 LBS | HEIGHT: 63 IN | BODY MASS INDEX: 27.29 KG/M2 | SYSTOLIC BLOOD PRESSURE: 104 MMHG | DIASTOLIC BLOOD PRESSURE: 70 MMHG

## 2024-05-21 DIAGNOSIS — J45.40 MODERATE PERSISTENT ASTHMA WITHOUT COMPLICATION: Primary | ICD-10-CM

## 2024-05-21 DIAGNOSIS — F41.1 GENERALIZED ANXIETY DISORDER: ICD-10-CM

## 2024-05-21 DIAGNOSIS — B00.9 HERPES SIMPLEX: ICD-10-CM

## 2024-05-21 DIAGNOSIS — K90.0 CELIAC DISEASE: ICD-10-CM

## 2024-05-21 PROBLEM — L70.0 ACNE VULGARIS: Status: RESOLVED | Noted: 2020-09-09 | Resolved: 2024-05-21

## 2024-05-21 PROCEDURE — 3008F BODY MASS INDEX DOCD: CPT | Performed by: NURSE PRACTITIONER

## 2024-05-21 PROCEDURE — 99213 OFFICE O/P EST LOW 20 MIN: CPT | Performed by: NURSE PRACTITIONER

## 2024-05-21 RX ORDER — PROPRANOLOL HYDROCHLORIDE 20 MG/1
20 TABLET ORAL DAILY
Qty: 90 TABLET | Refills: 1 | Status: SHIPPED | OUTPATIENT
Start: 2024-05-21 | End: 2024-11-14

## 2024-05-21 ASSESSMENT — ENCOUNTER SYMPTOMS
DEPRESSED MOOD: 0
SHORTNESS OF BREATH: 0
PALPITATIONS: 0
MUSCLE TENSION: 1
RHINORRHEA: 0
NERVOUS/ANXIOUS: 1
INSOMNIA: 0
PANIC: 0
DECREASED CONCENTRATION: 0
RESTLESSNESS: 0

## 2024-05-21 ASSESSMENT — PAIN SCALES - GENERAL: PAINLEVEL: 0-NO PAIN

## 2024-05-21 NOTE — PROGRESS NOTES
[facility] at [address]  [facility] [address]  [phone]  Fax [phone]      Patient ID: Jada Holt                              : 2000    Visit Date: 2024    Chief Complaint: Follow-up         Patient ID: Jada Holt is a 23 y.o. female.    Patient Active Problem List   Diagnosis    Dysmenorrhea    Benign essential tremor    Generalized anxiety disorder    Non-seasonal allergic rhinitis    Celiac disease    Herpes simplex    Moderate persistent asthma without complication    Routine physical examination         Current Outpatient Medications:     propranoloL (INDERAL) 20 mg tablet, Take 1 tablet (20 mg total) by mouth daily., Disp: 90 tablet, Rfl: 1    albuterol HFA (VENTOLIN HFA) 90 mcg/actuation inhaler, Inhale 2 puffs every 6 (six) hours as needed for wheezing., Disp: 18 g, Rfl: 1    BLISOVI 24 FE 1 mg-20 mcg (24)/75 mg (4) per tablet, Take 1 tablet by mouth once daily., Disp: , Rfl:     valACYclovir (VALTREX) 1 gram tablet, TAKE 1 TABLET BY MOUTH EVERY DAY, Disp: 30 tablet, Rfl: 1    Allergies   Allergen Reactions    Gluten Diarrhea and GI intolerance       Social History     Tobacco Use    Smoking status: Never    Smokeless tobacco: Never   Substance Use Topics    Alcohol use: Not Currently    Drug use: Never       Health Maintenance   Topic Date Due    COVID-19 Vaccine (2023- season) 2025 (Originally 2023)    Depression Screening  2024    Cervical Cancer Screening  2025    DTaP, Tdap, and Td Vaccines (8 - Td or Tdap) 2033    Zoster Vaccine (1 of 2) 10/01/2050    Meningococcal ACWY  Completed    HIB Vaccines  Completed    Hepatitis B Vaccines  Completed    IPV Vaccines  Completed    Influenza Vaccine  Completed    HPV Vaccines  Completed    Pneumococcal  Completed    RSV <20 months  Aged Out    HIV Screening  Discontinued    Hepatitis C Screening  Discontinued       HPI  Routine med  "check  Moving to [place] for new job.  Plans to stay with this office for her healthcare.    Asthma  There is no shortness of breath. This is a chronic problem. The current episode started more than 1 year ago. The problem occurs daily. Progression since onset: stable. Pertinent negatives include no chest pain, nasal congestion, postnasal drip, rhinorrhea or sneezing. Her symptoms are aggravated by URI. Her symptoms are alleviated by beta-agonist. She reports significant improvement on treatment. Her past medical history is significant for asthma.   Anxiety  Presents for follow-up visit. Symptoms include excessive worry, muscle tension and nervous/anxious behavior. Patient reports no chest pain, decreased concentration, depressed mood, insomnia, palpitations, panic, restlessness, shortness of breath or suicidal ideas. Symptoms occur most days. The severity of symptoms is moderate. The quality of sleep is good.     Her past medical history is significant for asthma. Compliance with medications is % (on Propranolol.). Treatment side effects: none.       The following have been reviewed and updated as appropriate in this visit:   Allergies  Meds  Problems         Review of System  Review of Systems   HENT:  Negative for postnasal drip, rhinorrhea and sneezing.    Respiratory:  Negative for shortness of breath.    Cardiovascular:  Negative for chest pain and palpitations.   Psychiatric/Behavioral:  Negative for decreased concentration and suicidal ideas. The patient is nervous/anxious. The patient does not have insomnia.        Objective     Vitals  Vitals:    05/21/24 1341   BP: 104/70   BP Location: Left upper arm   Patient Position: Sitting   Pulse: 86   Resp: 18   Temp: 36.6 °C (97.8 °F)   TempSrc: Temporal   SpO2: 96%   Weight: 69.9 kg (154 lb)   Height: 1.6 m (5' 3\")     Body mass index is 27.28 kg/m².      Physical Exam  Vitals reviewed.   Constitutional:       General: She is not in acute distress.     " Appearance: Normal appearance. She is not ill-appearing, toxic-appearing or diaphoretic.   Cardiovascular:      Rate and Rhythm: Normal rate and regular rhythm.      Heart sounds: No murmur heard.     No friction rub. No gallop.   Pulmonary:      Effort: Pulmonary effort is normal.      Breath sounds: Normal breath sounds. No wheezing, rhonchi or rales.   Musculoskeletal:      Cervical back: Neck supple. No rigidity or tenderness.      Right lower leg: No edema.      Left lower leg: No edema.   Lymphadenopathy:      Cervical: No cervical adenopathy.   Neurological:      Mental Status: She is alert and oriented to person, place, and time.   Psychiatric:         Mood and Affect: Mood and affect normal.         Speech: Speech normal.         Behavior: Behavior normal.         Thought Content: Thought content does not include suicidal ideation. Thought content does not include suicidal plan.         Assessment/Plan     Problem List Items Addressed This Visit       Generalized anxiety disorder     Propranolol 20mg daily in AM PRN  Stable.         Relevant Medications    propranoloL (INDERAL) 20 mg tablet    Celiac disease     Stable.  Managed with diet.         Herpes simplex     Valtrex PRN  Stable.         Moderate persistent asthma without complication - Primary     Stable  No recent flares  Albuterol PRN                JULIO Mckeon  5/21/2024

## 2024-10-04 ENCOUNTER — TELEMEDICINE (OUTPATIENT)
Dept: PRIMARY CARE | Facility: CLINIC | Age: 24
End: 2024-10-04
Payer: COMMERCIAL

## 2024-10-04 DIAGNOSIS — L03.032 ACUTE PARONYCHIA OF TOE OF LEFT FOOT: Primary | ICD-10-CM

## 2024-10-04 PROCEDURE — 99212 OFFICE O/P EST SF 10 MIN: CPT | Mod: 95 | Performed by: NURSE PRACTITIONER

## 2024-10-04 RX ORDER — CEPHALEXIN 500 MG/1
500 CAPSULE ORAL 4 TIMES DAILY
Qty: 28 CAPSULE | Refills: 0 | Status: SHIPPED | OUTPATIENT
Start: 2024-10-04 | End: 2024-10-11

## 2024-10-04 ASSESSMENT — ENCOUNTER SYMPTOMS
FEVER: 0
DIAPHORESIS: 0
LOSS OF MOTION: 0
MUSCLE WEAKNESS: 0
NUMBNESS: 0
LOSS OF SENSATION: 0
TINGLING: 0
CHILLS: 0
JOINT SWELLING: 0
INABILITY TO BEAR WEIGHT: 0
COLOR CHANGE: 1

## 2024-10-04 NOTE — PROGRESS NOTES
Verification of Patient Location:  The patient affirms they are currently located in the following state: [place]    Are you in your home or a private residence? Yes    Request for Consent:    Audio and Video Encounter   Hello, my name is JULIO Mckeon.  Before we proceed, can you please verify your identification by telling me your full name and date of birth?  Can you tell me who is in the room with you?    You and I are about to have a telemedicine check-in or visit because you have requested it.  This is a live video-conference.  I am a real person, speaking to you in real time.  There is no one else with me on the video-conference. I am not recording this conversation and I am asking you not to record it.  This telemedicine visit will be billed to your health insurance or you, if you are self-insured.  You understand you will be responsible for any copayments or coinsurances that apply to your telemedicine visit.  Communication platform used for this encounter:  [employer] Video Visit (Epic Video Client)       Before starting our telemedicine visit, I am required to get your consent for this virtual check-in or visit by telemedicine. Do you consent?    Patient Response to Request for Consent:  Yes    Patient Active Problem List   Diagnosis    Dysmenorrhea    Benign essential tremor    Generalized anxiety disorder    Non-seasonal allergic rhinitis    Celiac disease    Herpes simplex    Moderate persistent asthma without complication    Routine physical examination    Acute paronychia of toe of left foot     Current Outpatient Medications on File Prior to Visit   Medication Sig Dispense Refill    albuterol HFA (VENTOLIN HFA) 90 mcg/actuation inhaler Inhale 2 puffs every 6 (six) hours as needed for wheezing. 18 g 1    BLISOVI 24 FE 1 mg-20 mcg (24)/75 mg (4) per tablet Take 1 tablet by mouth once daily.      propranoloL (INDERAL) 20 mg tablet Take 1 tablet (20 mg total) by mouth daily. 90 tablet 1     valACYclovir (VALTREX) 1 gram tablet TAKE 1 TABLET BY MOUTH EVERY DAY 30 tablet 1     No current facility-administered medications on file prior to visit.     Allergies   Allergen Reactions    Gluten Diarrhea and GI intolerance     Social History     Tobacco Use    Smoking status: Never    Smokeless tobacco: Never   Substance Use Topics    Alcohol use: Not Currently    Drug use: Never     Health Maintenance   Topic Date Due    Depression Screening  Never done    Influenza Vaccine (1) 08/01/2024    COVID-19 Vaccine (4 - 2024-25 season) 09/01/2024    Cervical Cancer Screening  12/02/2025    DTaP, Tdap, and Td Vaccines (8 - Td or Tdap) 08/16/2033    Zoster Vaccine (1 of 2) 10/01/2050    RSV Vaccine (1 - 1-dose 75+ series) 10/01/2075    Meningococcal ACWY  Completed    HIB Vaccines  Completed    Hepatitis B Vaccines  Completed    IPV Vaccines  Completed    HPV Vaccines  Completed    Pneumococcal  Completed    RSV <20 months  Aged Out    HIV Screening  Discontinued    Hepatitis C Screening  Discontinued       Visit Documentation:  Subjective     Patient ID: Jada Holt is a 24 y.o. female.  2000      Infected L great toe    Toe Pain   There was no injury mechanism. The pain is present in the left toes. The quality of the pain is described as aching. The pain is moderate. The pain has been Constant since onset. Pertinent negatives include no inability to bear weight, loss of motion, loss of sensation, muscle weakness, numbness or tingling. The symptoms are aggravated by weight bearing and palpation. She has tried heat, elevation and rest for the symptoms. The treatment provided no relief.       The following have been reviewed and updated as appropriate in this visit:        Review of Systems   Constitutional:  Negative for chills, diaphoresis and fever.   Musculoskeletal:  Negative for gait problem and joint swelling.   Skin:  Positive for color change and rash.   Neurological:  Negative for tingling and  numbness.     Physical Exam  Constitutional:       General: She is not in acute distress.     Appearance: Normal appearance. She is not ill-appearing, toxic-appearing or diaphoretic.   Pulmonary:      Effort: Pulmonary effort is normal. No respiratory distress.   Skin:     Comments: L great toe with erythema and edema surrounding lower lateral nail bed.  Pustular superficial pocket noted along edge  Very tender per pt report   Neurological:      Mental Status: She is alert and oriented to person, place, and time.         Assessment & Plan  Acute paronychia of toe of left foot  Keep area clean and dry  Open to air when possible  Warm compress BID  Cephalexin QID x 7 days  Seek podiatrist if persists or worsens    Orders:    cephalexin (KEFLEX) 500 mg capsule; Take 1 capsule (500 mg total) by mouth 4 (four) times a day for 7 days.      Time Spent:  I spent 15 minutes on this date of service performing the following activities: obtaining history, performing examination, entering orders, documenting, preparing for visit, obtaining / reviewing records, and providing counseling and education.

## 2024-10-04 NOTE — ASSESSMENT & PLAN NOTE
Keep area clean and dry  Open to air when possible  Warm compress BID  Cephalexin QID x 7 days  Seek podiatrist if persists or worsens    Orders:    cephalexin (KEFLEX) 500 mg capsule; Take 1 capsule (500 mg total) by mouth 4 (four) times a day for 7 days.

## 2024-11-14 DIAGNOSIS — F41.1 GENERALIZED ANXIETY DISORDER: ICD-10-CM

## 2024-11-14 RX ORDER — PROPRANOLOL HYDROCHLORIDE 20 MG/1
20 TABLET ORAL DAILY
Qty: 90 TABLET | Refills: 1 | Status: SHIPPED | OUTPATIENT
Start: 2024-11-14

## 2024-11-14 NOTE — TELEPHONE ENCOUNTER
"Medicine Refill Request    Last Office Visit: 5/21/2024   Last Consult Visit: Visit date not found  Last Telemedicine Visit: 10/4/2024 Majo Howard CRNP    Next Appointment: Visit date not found      Current Outpatient Medications:     albuterol HFA (VENTOLIN HFA) 90 mcg/actuation inhaler, Inhale 2 puffs every 6 (six) hours as needed for wheezing., Disp: 18 g, Rfl: 1    BLISOVI 24 FE 1 mg-20 mcg (24)/75 mg (4) per tablet, Take 1 tablet by mouth once daily., Disp: , Rfl:     propranoloL (INDERAL) 20 mg tablet, Take 1 tablet (20 mg total) by mouth daily., Disp: 90 tablet, Rfl: 1    valACYclovir (VALTREX) 1 gram tablet, TAKE 1 TABLET BY MOUTH EVERY DAY, Disp: 30 tablet, Rfl: 1      BP Readings from Last 3 Encounters:   05/21/24 104/70   08/16/23 104/82   12/28/21 (!) 112/52       Recent Lab results:  No results found for: \"CHOL\", No results found for: \"HDL\", No results found for: \"LDLCALC\", No results found for: \"TRIG\"     No results found for: \"GLUCOSE\", No results found for: \"HGBA1C\"      No results found for: \"CREATININE\"    No results found for: \"TSH\"      No results found for: \"HGBA1C\"  "